# Patient Record
Sex: MALE | Race: WHITE | Employment: OTHER | URBAN - NONMETROPOLITAN AREA
[De-identification: names, ages, dates, MRNs, and addresses within clinical notes are randomized per-mention and may not be internally consistent; named-entity substitution may affect disease eponyms.]

---

## 2018-12-12 ENCOUNTER — HOSPITAL ENCOUNTER (OUTPATIENT)
Dept: PAIN MANAGEMENT | Age: 53
Discharge: HOME | End: 2018-12-12
Payer: MEDICAID

## 2018-12-12 ENCOUNTER — HOSPITAL ENCOUNTER (OUTPATIENT)
Dept: GENERAL RADIOLOGY | Age: 53
Discharge: HOME | End: 2018-12-12
Payer: MEDICAID

## 2018-12-12 VITALS
SYSTOLIC BLOOD PRESSURE: 125 MMHG | DIASTOLIC BLOOD PRESSURE: 82 MMHG | HEIGHT: 74 IN | HEART RATE: 104 BPM | BODY MASS INDEX: 19.25 KG/M2 | RESPIRATION RATE: 16 BRPM | TEMPERATURE: 99 F | WEIGHT: 150 LBS

## 2018-12-12 DIAGNOSIS — Z87.828 HX OF TRAUMA: ICD-10-CM

## 2018-12-12 DIAGNOSIS — R20.2 ARM PARESTHESIA, RIGHT: ICD-10-CM

## 2018-12-12 DIAGNOSIS — M54.12 CERVICAL RADICULOPATHY: ICD-10-CM

## 2018-12-12 DIAGNOSIS — M79.18 MYOFASCIAL PAIN: ICD-10-CM

## 2018-12-12 DIAGNOSIS — M54.2 CERVICAL PAIN: ICD-10-CM

## 2018-12-12 PROCEDURE — 99215 OFFICE O/P EST HI 40 MIN: CPT | Performed by: NURSE PRACTITIONER

## 2018-12-12 PROCEDURE — 99204 OFFICE O/P NEW MOD 45 MIN: CPT

## 2018-12-12 PROCEDURE — 72040 X-RAY EXAM NECK SPINE 2-3 VW: CPT

## 2018-12-12 RX ORDER — TIZANIDINE 4 MG/1
TABLET ORAL
Qty: 60 TABLET | Refills: 1 | Status: SHIPPED | OUTPATIENT
Start: 2018-12-12 | End: 2019-03-22 | Stop reason: SDUPTHER

## 2018-12-12 RX ORDER — MELOXICAM 7.5 MG/1
7.5 TABLET ORAL DAILY
Qty: 30 TABLET | Refills: 0 | Status: SHIPPED | OUTPATIENT
Start: 2018-12-12 | End: 2019-02-20

## 2018-12-12 RX ORDER — GABAPENTIN 300 MG/1
300 CAPSULE ORAL 4 TIMES DAILY
COMMUNITY
End: 2019-05-29

## 2018-12-12 RX ORDER — IBUPROFEN 600 MG/1
600 TABLET ORAL PRN
COMMUNITY

## 2018-12-12 ASSESSMENT — PAIN DESCRIPTION - ORIENTATION: ORIENTATION: RIGHT;LEFT

## 2018-12-12 ASSESSMENT — ENCOUNTER SYMPTOMS
WHEEZING: 0
SORE THROAT: 0
DIARRHEA: 0
VOMITING: 0
CONSTIPATION: 0
SHORTNESS OF BREATH: 0
BACK PAIN: 1
EYE REDNESS: 0

## 2018-12-12 ASSESSMENT — PAIN DESCRIPTION - FREQUENCY: FREQUENCY: CONTINUOUS

## 2018-12-12 ASSESSMENT — PAIN SCALES - GENERAL: PAINLEVEL_OUTOF10: 10

## 2018-12-12 ASSESSMENT — PAIN DESCRIPTION - PROGRESSION: CLINICAL_PROGRESSION: GRADUALLY WORSENING

## 2018-12-12 ASSESSMENT — PAIN DESCRIPTION - LOCATION: LOCATION: NECK;SHOULDER;HEAD

## 2018-12-12 ASSESSMENT — PAIN DESCRIPTION - PAIN TYPE: TYPE: CHRONIC PAIN

## 2018-12-12 NOTE — H&P
cervically to touch). Thoracic back: He exhibits tenderness. Back:    Neurological: He is alert and oriented to person, place, and time. He exhibits normal muscle tone. He displays no seizure activity. Coordination and gait normal.   Reflex Scores:       Tricep reflexes are 2+ on the right side and 2+ on the left side. Bicep reflexes are 2+ on the right side and 2+ on the left side. Brachioradialis reflexes are 2+ on the right side and 2+ on the left side. Patellar reflexes are 2+ on the right side and 2+ on the left side. Achilles reflexes are 2+ on the right side and 2+ on the left side. Bilateral upper arm strength essentially equal 4/5- and  essentially equal  Neg spurling's tested   Paresthesia noted right arm    Bilateral lower leg strength equal and negative foot drop   Skin: Skin is warm and dry. He is not diaphoretic. Psychiatric: He has a normal mood and affect. His behavior is normal. Judgment and thought content normal. He expresses no homicidal and no suicidal ideation. Nursing note and vitals reviewed. Active Problems:    Cervical pain    Cervical radiculopathy    Hx of trauma    Arm paresthesia, right    Myofascial pain  Resolved Problems:    * No resolved hospital problems. *      PLAN:  1. MRI Cervical spine w/o contrast related to right C6/c7  Radicular pain. This will asst me in [facility] vs Referral to neurosurgery. 2. Flex and Ext Cervical spine- May benefit from facet in future. 3. Cervical and Thoracic TPI-and occipital bilateral nerve blocks [employer]. 4. Neurontin [facility]- may overtake that at some point  5. Change Flexeril to Zanaflex 4mg ( Titration method). 6. Mobic 7.5mg q day x 1 month.     7. I have discussed cord compression signs and symptoms- the need to seek immediate attention if symptoms

## 2018-12-20 ENCOUNTER — HOSPITAL ENCOUNTER (OUTPATIENT)
Dept: MRI IMAGING | Age: 53
Discharge: HOME | End: 2018-12-20
Payer: MEDICAID

## 2018-12-20 DIAGNOSIS — M54.12 CERVICAL RADICULOPATHY: ICD-10-CM

## 2018-12-20 PROCEDURE — 72141 MRI NECK SPINE W/O DYE: CPT

## 2019-01-02 ENCOUNTER — TELEPHONE (OUTPATIENT)
Dept: PAIN MANAGEMENT | Age: 54
End: 2019-01-02

## 2019-01-03 ENCOUNTER — TELEPHONE (OUTPATIENT)
Dept: PAIN MANAGEMENT | Age: 54
End: 2019-01-03

## 2019-01-09 ENCOUNTER — HOSPITAL ENCOUNTER (OUTPATIENT)
Dept: PAIN MANAGEMENT | Age: 54
Discharge: HOME | End: 2019-01-09
Payer: MEDICAID

## 2019-01-09 VITALS
TEMPERATURE: 96.9 F | RESPIRATION RATE: 16 BRPM | HEART RATE: 73 BPM | OXYGEN SATURATION: 100 % | DIASTOLIC BLOOD PRESSURE: 82 MMHG | SYSTOLIC BLOOD PRESSURE: 157 MMHG

## 2019-01-09 PROCEDURE — 64405 NJX AA&/STRD GR OCPL NRV: CPT

## 2019-01-09 PROCEDURE — 6360000002 HC RX W HCPCS

## 2019-01-09 PROCEDURE — 20553 NJX 1/MLT TRIGGER POINTS 3/>: CPT | Performed by: NURSE PRACTITIONER

## 2019-01-09 PROCEDURE — 20553 NJX 1/MLT TRIGGER POINTS 3/>: CPT

## 2019-01-09 PROCEDURE — 2500000003 HC RX 250 WO HCPCS

## 2019-01-09 PROCEDURE — 64405 NJX AA&/STRD GR OCPL NRV: CPT | Performed by: NURSE PRACTITIONER

## 2019-01-09 RX ORDER — LIDOCAINE HYDROCHLORIDE 10 MG/ML
INJECTION, SOLUTION EPIDURAL; INFILTRATION; INTRACAUDAL; PERINEURAL
Status: COMPLETED | OUTPATIENT
Start: 2019-01-09 | End: 2019-01-09

## 2019-01-09 RX ORDER — BUPIVACAINE HYDROCHLORIDE 5 MG/ML
INJECTION, SOLUTION EPIDURAL; INTRACAUDAL
Status: COMPLETED | OUTPATIENT
Start: 2019-01-09 | End: 2019-01-09

## 2019-01-09 RX ORDER — DEXAMETHASONE SODIUM PHOSPHATE 10 MG/ML
INJECTION INTRAMUSCULAR; INTRAVENOUS
Status: COMPLETED | OUTPATIENT
Start: 2019-01-09 | End: 2019-01-09

## 2019-01-09 RX ADMIN — DEXAMETHASONE SODIUM PHOSPHATE 20 MG: 10 INJECTION INTRAMUSCULAR; INTRAVENOUS at 09:18

## 2019-01-09 RX ADMIN — BUPIVACAINE HYDROCHLORIDE 17 ML: 5 INJECTION, SOLUTION EPIDURAL; INTRACAUDAL at 09:17

## 2019-01-09 RX ADMIN — LIDOCAINE HYDROCHLORIDE 17 ML: 10 INJECTION, SOLUTION EPIDURAL; INFILTRATION; INTRACAUDAL; PERINEURAL at 09:18

## 2019-02-20 ENCOUNTER — HOSPITAL ENCOUNTER (OUTPATIENT)
Dept: PAIN MANAGEMENT | Age: 54
Discharge: HOME | End: 2019-02-20
Payer: MEDICAID

## 2019-02-20 ENCOUNTER — TELEPHONE (OUTPATIENT)
Dept: NEUROSURGERY | Age: 54
End: 2019-02-20

## 2019-02-20 VITALS
OXYGEN SATURATION: 99 % | HEART RATE: 96 BPM | BODY MASS INDEX: 20.15 KG/M2 | SYSTOLIC BLOOD PRESSURE: 124 MMHG | WEIGHT: 152 LBS | HEIGHT: 73 IN | RESPIRATION RATE: 16 BRPM | TEMPERATURE: 97.7 F | DIASTOLIC BLOOD PRESSURE: 90 MMHG

## 2019-02-20 DIAGNOSIS — M54.12 CERVICAL RADICULOPATHY: Primary | ICD-10-CM

## 2019-02-20 DIAGNOSIS — M54.2 CERVICAL PAIN: ICD-10-CM

## 2019-02-20 PROCEDURE — 99215 OFFICE O/P EST HI 40 MIN: CPT | Performed by: NURSE PRACTITIONER

## 2019-02-20 PROCEDURE — 99214 OFFICE O/P EST MOD 30 MIN: CPT

## 2019-02-20 RX ORDER — TIZANIDINE 4 MG/1
TABLET ORAL
Qty: 60 TABLET | Refills: 1 | Status: SHIPPED | OUTPATIENT
Start: 2019-02-20 | End: 2019-05-29

## 2019-02-20 RX ORDER — MELOXICAM 7.5 MG/1
7.5 TABLET ORAL DAILY
Qty: 30 TABLET | Refills: 0 | Status: SHIPPED | OUTPATIENT
Start: 2019-02-20

## 2019-02-20 ASSESSMENT — PAIN DESCRIPTION - FREQUENCY: FREQUENCY: CONTINUOUS

## 2019-02-20 ASSESSMENT — PAIN - FUNCTIONAL ASSESSMENT: PAIN_FUNCTIONAL_ASSESSMENT: PREVENTS OR INTERFERES SOME ACTIVE ACTIVITIES AND ADLS

## 2019-02-20 ASSESSMENT — PAIN DESCRIPTION - PROGRESSION: CLINICAL_PROGRESSION: NOT CHANGED

## 2019-02-20 ASSESSMENT — ENCOUNTER SYMPTOMS
WHEEZING: 0
NAUSEA: 0
EYE REDNESS: 0
SHORTNESS OF BREATH: 0
SORE THROAT: 0
CONSTIPATION: 0
EYE PAIN: 0

## 2019-02-20 ASSESSMENT — PAIN DESCRIPTION - ORIENTATION: ORIENTATION: UPPER

## 2019-02-20 ASSESSMENT — PAIN DESCRIPTION - ONSET: ONSET: ON-GOING

## 2019-02-20 ASSESSMENT — PAIN SCALES - GENERAL: PAINLEVEL_OUTOF10: 4

## 2019-02-20 ASSESSMENT — PAIN DESCRIPTION - DIRECTION: RADIATING_TOWARDS: INTO SHOULDERS

## 2019-02-20 ASSESSMENT — PAIN DESCRIPTION - PAIN TYPE: TYPE: CHRONIC PAIN

## 2019-02-20 ASSESSMENT — PAIN DESCRIPTION - LOCATION: LOCATION: NECK

## 2019-02-20 ASSESSMENT — PAIN DESCRIPTION - DESCRIPTORS: DESCRIPTORS: CONSTANT;SPASM

## 2019-03-12 ENCOUNTER — TELEPHONE (OUTPATIENT)
Dept: PAIN MANAGEMENT | Age: 54
End: 2019-03-12

## 2019-03-22 ENCOUNTER — OFFICE VISIT (OUTPATIENT)
Dept: NEUROSURGERY | Age: 54
End: 2019-03-22
Payer: MEDICAID

## 2019-03-22 VITALS
BODY MASS INDEX: 19.56 KG/M2 | SYSTOLIC BLOOD PRESSURE: 139 MMHG | HEIGHT: 74 IN | DIASTOLIC BLOOD PRESSURE: 87 MMHG | HEART RATE: 77 BPM | WEIGHT: 152.4 LBS

## 2019-03-22 DIAGNOSIS — R20.0 NUMBNESS AND TINGLING IN RIGHT HAND: ICD-10-CM

## 2019-03-22 DIAGNOSIS — M25.511 CHRONIC RIGHT SHOULDER PAIN: ICD-10-CM

## 2019-03-22 DIAGNOSIS — M48.02 FORAMINAL STENOSIS OF CERVICAL REGION: Primary | ICD-10-CM

## 2019-03-22 DIAGNOSIS — R29.818 FINE MOTOR IMPAIRMENT: ICD-10-CM

## 2019-03-22 DIAGNOSIS — M54.2 NECK PAIN: ICD-10-CM

## 2019-03-22 DIAGNOSIS — R29.898 FINE MOTOR IMPAIRMENT: ICD-10-CM

## 2019-03-22 DIAGNOSIS — R20.2 NUMBNESS AND TINGLING IN RIGHT HAND: ICD-10-CM

## 2019-03-22 DIAGNOSIS — G89.29 CHRONIC RIGHT SHOULDER PAIN: ICD-10-CM

## 2019-03-22 DIAGNOSIS — M79.601 RIGHT ARM PAIN: ICD-10-CM

## 2019-03-22 DIAGNOSIS — M50.30 DDD (DEGENERATIVE DISC DISEASE), CERVICAL: ICD-10-CM

## 2019-03-22 PROCEDURE — 99204 OFFICE O/P NEW MOD 45 MIN: CPT | Performed by: NURSE PRACTITIONER

## 2019-03-22 RX ORDER — CYCLOBENZAPRINE HCL 10 MG
1 TABLET ORAL EVERY 8 HOURS PRN
COMMUNITY
Start: 2019-03-21 | End: 2019-05-29 | Stop reason: SDUPTHER

## 2019-03-22 RX ORDER — LORATADINE 10 MG/1
1 TABLET ORAL DAILY
COMMUNITY
Start: 2019-03-21

## 2019-03-22 RX ORDER — AMITRIPTYLINE HYDROCHLORIDE 100 MG/1
1 TABLET, FILM COATED ORAL DAILY
COMMUNITY
Start: 2019-03-21 | End: 2019-05-29

## 2019-03-22 ASSESSMENT — ENCOUNTER SYMPTOMS
SPUTUM PRODUCTION: 1
PHOTOPHOBIA: 1
BACK PAIN: 1
STRIDOR: 1
VOMITING: 1
NAUSEA: 1
COUGH: 1
SORE THROAT: 1
HEARTBURN: 1
SINUS PAIN: 1
DIARRHEA: 1

## 2019-05-23 ENCOUNTER — OFFICE VISIT (OUTPATIENT)
Dept: NEUROSURGERY | Age: 54
End: 2019-05-23
Payer: MEDICAID

## 2019-05-23 VITALS
HEIGHT: 74 IN | WEIGHT: 146 LBS | DIASTOLIC BLOOD PRESSURE: 76 MMHG | BODY MASS INDEX: 18.74 KG/M2 | HEART RATE: 80 BPM | SYSTOLIC BLOOD PRESSURE: 123 MMHG

## 2019-05-23 DIAGNOSIS — R20.0 NUMBNESS AND TINGLING IN RIGHT HAND: ICD-10-CM

## 2019-05-23 DIAGNOSIS — R29.818 FINE MOTOR IMPAIRMENT: ICD-10-CM

## 2019-05-23 DIAGNOSIS — R29.898 FINE MOTOR IMPAIRMENT: ICD-10-CM

## 2019-05-23 DIAGNOSIS — M79.601 RIGHT ARM PAIN: ICD-10-CM

## 2019-05-23 DIAGNOSIS — M48.02 FORAMINAL STENOSIS OF CERVICAL REGION: Primary | ICD-10-CM

## 2019-05-23 DIAGNOSIS — G89.29 CHRONIC RIGHT SHOULDER PAIN: ICD-10-CM

## 2019-05-23 DIAGNOSIS — M54.2 NECK PAIN: ICD-10-CM

## 2019-05-23 DIAGNOSIS — M25.511 CHRONIC RIGHT SHOULDER PAIN: ICD-10-CM

## 2019-05-23 DIAGNOSIS — R20.2 NUMBNESS AND TINGLING IN RIGHT HAND: ICD-10-CM

## 2019-05-23 DIAGNOSIS — M50.30 DDD (DEGENERATIVE DISC DISEASE), CERVICAL: ICD-10-CM

## 2019-05-23 PROCEDURE — 99214 OFFICE O/P EST MOD 30 MIN: CPT | Performed by: NEUROLOGICAL SURGERY

## 2019-05-23 NOTE — PROGRESS NOTES
[facility]  Office Visit      Chief Complaint   Patient presents with    Follow-up     Reevaluate neck and RUE pain     5/23/2019: Mr. Renata Pinedo returns to clinic today to re-evaluate his neck and RUE pain. He states that he continues to have significant pain. He reports that most of his pain is in his posterior neck and low back. His right arm pain is intermittent. He states that the entire right arm will \"hang limp, like it's dead\". 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 HISTORY OF PRESENT ILLNESS:    Dimple Dempsey is a 48 y.o. male contract  who presents with posterior neck pain and right arm pain that has been ongoing for about 4-5 years. The pain does radiate into the right trapezial and shoulder region. He also describes his right hand going numb and goes up to his elbow. His pain is mostly located in the neck. The patient complains of numbness of the right hand. He reports that he has chronic left arm numbness due to an old injury. He does drop objects frequently. He does have numbness in the fingertips, trouble using hands to perform fine motor tasks without ataxia. He states that he had a right carpal tunnel release in September of 2018. The patient has underwent a non-operative treatment course that has included:  NSAIDs (mobic, advil)  Muscle Relaxers  (flexeril, zanaflex)  Gabapentin   Opiates (Norco)  Physical Therapy       Of note he does use tobacco and does not take blood thinning medications.                Past Medical History:   Diagnosis Date    Broken ribs     Bronchitis     COPD (chronic obstructive pulmonary disease) (HCC)     Fractures     GERD (gastroesophageal reflux disease)     Hepatitis     Hiatal hernia     Marijuana use, episodic     to help with sleeping    Rheumatoid arthritis ([address]     Shingles        Past Surgical History:   Procedure Laterality Date    ARM SURGERY Left     CARPAL TUNNEL RELEASE Right 09/29/2018    HAND SURGERY      with fusion    HERNIA REPAIR         Current Outpatient Medications   Medication Sig Dispense Refill    VENTOLIN  (90 Base) MCG/ACT inhaler Inhale 2 puffs into the lungs every 6 hours as needed      cyclobenzaprine (FLEXERIL) 10 MG tablet Take 1 tablet by mouth every 8 hours as needed      loratadine (CLARITIN) 10 MG tablet Take 1 tablet by mouth daily      meloxicam (MOBIC) 7.5 MG tablet Take 1 tablet by mouth daily 30 tablet 0    tiZANidine (ZANAFLEX) 4 MG tablet 1/4 tablet with meals 1/2 to whole tablet at bedtime 60 tablet 1    ibuprofen (ADVIL;MOTRIN) 600 MG tablet Take 600 mg by mouth as needed for Pain      amitriptyline (ELAVIL) 100 MG tablet Take 1 tablet by mouth daily      gabapentin (NEURONTIN) 300 MG capsule Take 300 mg by mouth 4 times daily. [facility] Pantoprazole Sodium (PROTONIX PO) Take by mouth 2 times daily       No current facility-administered medications for this visit. Allergies:  Patient has no known allergies. Social History:   Social History     Tobacco Use   Smoking Status Current Every Day Smoker    Packs/day: 1.00    Years: 45.00    Pack years: 45.00    Types: Cigarettes   Smokeless Tobacco Former User     Social History     Substance and Sexual Activity   Alcohol Use Yes         Family History:   History reviewed. No pertinent family history. REVIEW OF SYSTEMS:  Review of Systems   Constitutional: Positive for chills, diaphoresis, fever, malaise/fatigue and weight loss. HENT: Positive for congestion, hearing loss, sinus pain, sore throat and tinnitus. Eyes: Positive for photophobia. Respiratory: Positive for cough, sputum production and stridor. Cardiovascular: Positive for claudication and leg swelling. Gastrointestinal: Positive for diarrhea, heartburn, nausea and vomiting. Genitourinary: Positive for flank pain. Musculoskeletal: Positive for back pain, joint pain and neck pain.    Skin: Positive for The multiplanar, multisequence MR imaging of the cervical spine is   performed without intravenous contrast enhancement. There is no previous study for comparison. There is loss of cervical lordosis with a very mild focal kyphosis at   C5-C6. The vertebral body heights are normal.   There is loss of height and signal of the intervertebral disc of the   entire cervical spine, more pronounced at C3-4 and C5-C6, suggesting   degeneration/desiccation. There is abnormal bone marrow signal, short T1 and prolonged T2,   involving the vertebral bodies C5-C6. No loss of height of the   vertebral bodies. The atlantoaxial articulation appears normal in intact. The   surrounding ligaments are normal in intact. C2, space C2-3. There is moderate bilateral facet arthropathy. There   is a right neural foraminal stenosis. No significant spinal stenosis. C3, space C3-4. There is mild disc bulging. There is bilateral facet   arthropathy. There is moderate bilateral neural foraminal stenosis. No   significant spinal stenosis. C4, space C4-5. There is bilateral facet arthropathy. There is a very   mild disc bulging. There are prominent uncinate spurs bilaterally. There is bilateral neural foraminal stenosis. No spinal stenosis. C5, space C5-C6. There is moderate disc bulging asymmetrically more   pronounced towards left. There is bilateral neural foraminal stenosis,   more severe on the left. There is bilateral facet arthropathy. No   significant spinal stenosis. C6, space C6-C7. There is mild diffuse disc bulging, asymmetrically   more pronounced towards left. There is left neural foraminal stenosis. No significant spinal stenosis. C7, space C7-T1. No disc bulging or herniation. There is bilateral   facet arthropathy. The neural foramina spinal canal are patent. There   is a mild superior end compression of vertebra T1 which probably   appears chronic.    There is a tiny nodule located in the posterior wall of the   nasopharynx in the midline which may represent a small mucous   cyst/Tornwaldt cyst?. There is a small area of signal abnormality, short T1 and prolonged T2   in the soft tissues opposite and anterior to the vertebrae C5-C6, and   the pharyngoesophageal junction. This may represent a small focal   inflammatory process. The etiology and clinical significance is not   certain. The size and signal of the cervical spinal cord appear normal. No   focal intrinsic abnormality, abnormal signal, enlargement or   expansion. There is mild extrinsic pressure opposite disc C5-C6 due to   disc compression. The visualized chikis and medulla oblongata appear normal. The inferior   pole of the cerebellar tonsils is marginally below the plane of   foramen magnum which may represent a normal variation. This may be   further evaluated with MR imaging of the brain.       Impression   Abnormal bone marrow signal in the vertebral body C5-C6. This may represent diffuse bone marrow edema suggesting a trauma or   inflammatory process. This may also represent metastatic bone marrow   replacement? . Similar changes may also be seen after radiation   exposure. This may be clinically correlated and further evaluated. A   follow-up MR imaging of the cervical spine with contrast enhancement   may be obtained. Cervical spondylosis. A prominent disc osteophyte complexes, facetal arthropathy at multiple   levels and resultant neural foraminal stenosis as detailed above. No   significant spinal stenosis at any level. A mild cerebellar tonsillar ectopy. Signed by Dr Kareem Plascencia on 12/21/2018 8:20 AM   I have personally reviewed these imagesand my interpretation is:   There is DDD throughout  C3-4 mild to moderate bilateral foraminal stenosis L>R  C4-5 moderate right and mild left foraminal  stenosis  C5-6 moderate to severe left, mild to moderate right, mild retrolisthesis that measures 2-3mm, the C5 and C6 vertebral bodies are hyperintense on T2 and STIR imaging, this is consistent with edema or either an inflammatory process, since it is hyperintense on T2 we feel that metastatic disease is less likely. C6-7 mild to moderate left foraminal stenosis      ASSESSMENT:    Mariya English is a 48 y.o. male with complaints of neck pain and right arm pain and weakness. ICD-10-CM    1. Foraminal stenosis of cervical region M99.81 MRI CERVICAL SPINE W WO CONTRAST   2. DDD (degenerative disc disease), cervical M50.30 MRI CERVICAL SPINE W WO CONTRAST   3. Neck pain M54.2 MRI CERVICAL SPINE W WO CONTRAST   4. Chronic right shoulder pain M25.511 MRI CERVICAL SPINE W WO CONTRAST    G89.29    5. Right arm pain M79.601 MRI CERVICAL SPINE W WO CONTRAST   6. Numbness and tingling in right hand R20.0 MRI CERVICAL SPINE W WO CONTRAST    R20.2    7. Fine motor impairment R29.818 MRI CERVICAL SPINE W WO CONTRAST    R29.898        PLAN:  We have again discussed the results of the MRI cervical spine with MR. Meier at length. We explained that he does have moderate to severe narrowing at a few levels, however, some of his pain correlates and some of his symptoms do not. Given that some of his symptoms are chronic and some could be related to carpal tunnel, we do not feel that undergoing a neurosurgical intervention at this time would dramatically improve his pain syndrome.   We recommend he continue with pain management.    -Obtain repeat MRI cervical spine w/wo given hyperintensities of C5 and C6 vertebral bodies   -Follow up after imaging      Alisson Ramos DO

## 2019-05-29 ENCOUNTER — HOSPITAL ENCOUNTER (OUTPATIENT)
Dept: PAIN MANAGEMENT | Age: 54
Discharge: HOME | End: 2019-05-29
Payer: MEDICAID

## 2019-05-29 VITALS
SYSTOLIC BLOOD PRESSURE: 147 MMHG | BODY MASS INDEX: 18.29 KG/M2 | HEIGHT: 74 IN | RESPIRATION RATE: 16 BRPM | OXYGEN SATURATION: 100 % | HEART RATE: 96 BPM | WEIGHT: 142.5 LBS | TEMPERATURE: 98 F | DIASTOLIC BLOOD PRESSURE: 88 MMHG

## 2019-05-29 DIAGNOSIS — R20.2 ARM PARESTHESIA, RIGHT: Primary | ICD-10-CM

## 2019-05-29 PROCEDURE — 99214 OFFICE O/P EST MOD 30 MIN: CPT | Performed by: NURSE PRACTITIONER

## 2019-05-29 PROCEDURE — 99213 OFFICE O/P EST LOW 20 MIN: CPT

## 2019-05-29 RX ORDER — CYCLOBENZAPRINE HCL 10 MG
10 TABLET ORAL 2 TIMES DAILY PRN
Qty: 30 TABLET | Refills: 1 | Status: SHIPPED | OUTPATIENT
Start: 2019-05-29 | End: 2021-04-20

## 2019-05-29 RX ORDER — EMOLLIENT BASE
CREAM (GRAM) TOPICAL
Qty: 300 G | Refills: 5 | Status: SHIPPED | OUTPATIENT
Start: 2019-05-29

## 2019-05-29 ASSESSMENT — ENCOUNTER SYMPTOMS
WHEEZING: 0
EYE PAIN: 0
CONSTIPATION: 0
SORE THROAT: 0
ABDOMINAL PAIN: 0
SHORTNESS OF BREATH: 0
BACK PAIN: 1
EYE REDNESS: 0

## 2019-05-29 ASSESSMENT — PAIN DESCRIPTION - DIRECTION: RADIATING_TOWARDS: INTO SHOULDERS

## 2019-05-29 ASSESSMENT — PAIN DESCRIPTION - LOCATION: LOCATION: NECK

## 2019-05-29 ASSESSMENT — PAIN SCALES - GENERAL: PAINLEVEL_OUTOF10: 7

## 2019-05-29 ASSESSMENT — PAIN DESCRIPTION - FREQUENCY: FREQUENCY: CONTINUOUS

## 2019-05-29 ASSESSMENT — PAIN DESCRIPTION - PAIN TYPE: TYPE: CHRONIC PAIN

## 2019-05-29 ASSESSMENT — PAIN - FUNCTIONAL ASSESSMENT: PAIN_FUNCTIONAL_ASSESSMENT: PREVENTS OR INTERFERES SOME ACTIVE ACTIVITIES AND ADLS

## 2019-05-29 ASSESSMENT — PAIN DESCRIPTION - ORIENTATION: ORIENTATION: UPPER

## 2019-05-29 ASSESSMENT — PAIN DESCRIPTION - PROGRESSION: CLINICAL_PROGRESSION: NOT CHANGED

## 2019-05-29 ASSESSMENT — PAIN DESCRIPTION - DESCRIPTORS: DESCRIPTORS: CONSTANT;SPASM

## 2019-05-29 ASSESSMENT — PAIN DESCRIPTION - ONSET: ONSET: ON-GOING

## 2019-05-29 NOTE — PROGRESS NOTES
Nursing Admission Record    Current Issues / Falls / ER Visits: Follow up MRI Cervical Spine W WO Contrast- Not Completed. Patient also referred to [facility]. Patient was seen by Dr. Flor Escobar on 5/23/2019. Discuss Last urine drug screen on 2/20/2019. UDS showed positive for marijuana, hydrocodone and benzos. Percentage of Pain Relief after Last Procedure:  N/A    Opioids Prescribed: None    Was Medication Brought to Appt: N/A    Hx of any Neck/Back Surgeries? None    Is Patient currently taking a blood thinner?  None    MRI exams received in the past 2 years:  Yes MRI Cervical Spine        When 12/2018                                              Where [facility]       Imaging on chart: Yes         Imaging records requested: No    CT exam received during the last 12 months: No       When na                                              Where na       Imaging on chart: No         Imaging records requested: No    X-ray exam received during the last 12 months: Yes XR Cervical Spine       When 12/2018                                              Where [facility]       Imaging on chart: Yes         Imaging records requested: No    Nerve Conduction Study/EMG:  Yes       When 2018                                              Where [facility]       Imaging on chart: Yes- [facility] referral notes       Imaging records requested: No    Physical therapy during the last 6 months: Yes       When: 2018                        Where  [facility] during the last 12 months: No       When: na                                             Where  na    PEG Score: 8.7    Last PEG Score: 4.7    Annual ORT Score: 4    Annual PHQ Score: 8    Last UDS Results: 2/20/2019 non compliant  - Repeat UDS Today    Education Provided:  [x] Review of Charlotte Hinojosa  [] Agreement Review  [x] PEG Score Calculated [] PHQ Score Calculated [] ORT Score Calculated    [] Compliance Issues Discussed [] Cognitive Behavior Needs [x] Exercise [] Review of Test [] Financial Issues  [x] Tobacco/Alcohol Use Reviewed [x] Teaching [] New Patient [] Picture Obtained    Physician Plan:  [] Outgoing Referral  [] Pharmacy Consult  [] Test Ordered [] Prescription Ordered/Changed [] Blood Thinner Request Form  [] Obtained Test Results / Consult Notes  [] UDS due at next visit, verified per EPIC      [] Suspected Physical Abuse or Suicide Risk assessed - IF YES COMPLETE QUESTIONS BELOW    If any of the following questions are answered yes - contact attending physician for referral:    Has been considering harming self to escape stress, pain problems? [] YES  [x] NO  Has a suicide plan? [] YES  [x] NO  Has attempted suicide in the past?   [] YES  [x] NO  Has a close friend or family member who committed suicide?   [] YES  [x] NO    Assessment Completed by:  Electronically signed by Lisa Guthrie RN on 5/29/2019 at 2:56 PM

## 2019-05-29 NOTE — PROGRESS NOTES
[facility]  Office Note    Patient Name: Dimple Dempsey    MR #: [id number]    Account #: [de-identified]    : 1965    Age: 48 y.o. Sex: male    Date: 2019    PCP: Domingo Manzanares    Chief Complaint:   Chief Complaint   Patient presents with    Neck Pain       History of Present Illness:     Dimple Dempsey is a 48 y.o. male who presents to the office for primary cervical radicular pain. Patient has seen neurosurgery and interim. I read those notes. Patient uses Modic and Zanaflex. Neurontin via neurology. UDS collected from last visit. Aberrancy noted offer her mental health. We'll continue nonopiate interventions. Pt reports \"has titrated off neurontin neurology- reports dont want that med any more, would like topical gabapentin - it is interesting that same fill on was on same month as abnormal UDS\".      Past Visit HPI: I read last pain management note    Current PE.7    Past PE.7    ORT Score:4    PHQ-9 Score: 8    Current Pain Assessment  Pain Assessment  Pain Assessment: 0-10  Pain Level: 7  Patient's Stated Pain Goal: No pain  Pain Type: Chronic pain  Pain Location: Neck  Pain Orientation: Upper  Pain Radiating Towards: into shoulders  Pain Descriptors: Constant, Spasm  Pain Frequency: Continuous  Pain Onset: On-going  Clinical Progression: Not changed  Functional Pain Assessment: Prevents or interferes some active activities and ADLs  Non-Pharmaceutical Pain Intervention(s): Repositioned, Rest  Multiple Pain Sites: No    Employment:     Past Medical Histoy  Past Medical History:   Diagnosis Date    Broken ribs     Bronchitis     COPD (chronic obstructive pulmonary disease) (HCC)     Fractures     GERD (gastroesophageal reflux disease)     Hepatitis     Hiatal hernia     Marijuana use, episodic     to help with sleeping    Rheumatoid arthritis ([facility] [address]     Shingles        Surgery History  Past Surgical History:   Procedure Laterality Date mild disc bulging. There are prominent uncinate spurs bilaterally. There is bilateral neural foraminal stenosis. No spinal stenosis. C5, space C5-C6. There is moderate disc bulging asymmetrically more   pronounced towards left. There is bilateral neural foraminal stenosis,   more severe on the left. There is bilateral facet arthropathy. No   significant spinal stenosis. C6, space C6-C7. There is mild diffuse disc bulging, asymmetrically   more pronounced towards left. There is left neural foraminal stenosis. No significant spinal stenosis. C7, space C7-T1. No disc bulging or herniation. There is bilateral   facet arthropathy. The neural foramina spinal canal are patent. There   is a mild superior end compression of vertebra T1 which probably   appears chronic. There is a tiny nodule located in the posterior wall of the   nasopharynx in the midline which may represent a small mucous   cyst/Tornwaldt cyst?. There is a small area of signal abnormality, short T1 and prolonged T2   in the soft tissues opposite and anterior to the vertebrae C5-C6, and   the pharyngoesophageal junction. This may represent a small focal   inflammatory process. The etiology and clinical significance is not   certain. The size and signal of the cervical spinal cord appear normal. No   focal intrinsic abnormality, abnormal signal, enlargement or   expansion. There is mild extrinsic pressure opposite disc C5-C6 due to   disc compression. The visualized chikis and medulla oblongata appear normal. The inferior   pole of the cerebellar tonsils is marginally below the plane of   foramen magnum which may represent a normal variation. This may be   further evaluated with MR imaging of the brain.       Impression   Abnormal bone marrow signal in the vertebral body C5-C6. This may represent diffuse bone marrow edema suggesting a trauma or   inflammatory process. This may also represent metastatic bone marrow   replacement? . Similar narcotic use in younger patients. Patientencouraged to set daily goals of exercising and if on narcotics decreasing daily narcotic intake. Discussed with the patient about the development of hyperalgesia with long term narcotic intake. CC:  NEMO Jamison, 6/8/2019 at 7:53 PM    EMR dragon/transcription disclaimer: Much of this encounter note is electronic transcription/translation of spoken language to printed tach. Electronic translation of spoken language may be erroneous, or at times, nonsensical words or phrases may be inadvertently transcribed.  Although, I have reviewed the note for such errors, some may still exist.

## 2019-06-06 ENCOUNTER — HOSPITAL ENCOUNTER (OUTPATIENT)
Dept: MRI IMAGING | Age: 54
Discharge: HOME | End: 2019-06-06
Payer: MEDICAID

## 2019-06-06 DIAGNOSIS — G89.29 CHRONIC RIGHT SHOULDER PAIN: ICD-10-CM

## 2019-06-06 DIAGNOSIS — R20.0 NUMBNESS AND TINGLING IN RIGHT HAND: ICD-10-CM

## 2019-06-06 DIAGNOSIS — M25.511 CHRONIC RIGHT SHOULDER PAIN: ICD-10-CM

## 2019-06-06 DIAGNOSIS — M48.02 FORAMINAL STENOSIS OF CERVICAL REGION: ICD-10-CM

## 2019-06-06 DIAGNOSIS — R20.2 NUMBNESS AND TINGLING IN RIGHT HAND: ICD-10-CM

## 2019-06-06 DIAGNOSIS — M79.601 RIGHT ARM PAIN: ICD-10-CM

## 2019-06-06 DIAGNOSIS — M54.2 NECK PAIN: ICD-10-CM

## 2019-06-06 DIAGNOSIS — M50.30 DDD (DEGENERATIVE DISC DISEASE), CERVICAL: ICD-10-CM

## 2019-06-06 DIAGNOSIS — R29.898 FINE MOTOR IMPAIRMENT: ICD-10-CM

## 2019-06-06 DIAGNOSIS — R29.818 FINE MOTOR IMPAIRMENT: ICD-10-CM

## 2019-06-10 ENCOUNTER — TELEPHONE (OUTPATIENT)
Dept: NEUROSURGERY | Age: 54
End: 2019-06-10

## 2019-06-10 DIAGNOSIS — F40.240 CLAUSTROPHOBIA: Primary | ICD-10-CM

## 2019-06-10 RX ORDER — DIAZEPAM 5 MG/1
TABLET ORAL
Qty: 2 TABLET | Refills: 0 | Status: SHIPPED | OUTPATIENT
Start: 2019-06-10 | End: 2019-06-17

## 2019-06-10 NOTE — TELEPHONE ENCOUNTER
Patient could not sit still for MRI. He would like something to call him down during procedure. Please advise.

## 2019-06-12 NOTE — TELEPHONE ENCOUNTER
Spoke with patient. I instructed patient to  prescription the day of procedure. I advised that he take 1 tabet one hour prior to MRI and 1 tablet 30 minutes prior to MRI. Patient verbalized understanding.

## 2019-06-18 ENCOUNTER — HOSPITAL ENCOUNTER (OUTPATIENT)
Dept: MRI IMAGING | Age: 54
Discharge: HOME | End: 2019-06-18
Payer: MEDICAID

## 2019-06-18 DIAGNOSIS — R29.898 FINE MOTOR IMPAIRMENT: ICD-10-CM

## 2019-06-18 DIAGNOSIS — M54.2 NECK PAIN: ICD-10-CM

## 2019-06-18 DIAGNOSIS — R20.2 NUMBNESS AND TINGLING IN RIGHT HAND: ICD-10-CM

## 2019-06-18 DIAGNOSIS — G89.29 CHRONIC RIGHT SHOULDER PAIN: ICD-10-CM

## 2019-06-18 DIAGNOSIS — M79.601 RIGHT ARM PAIN: ICD-10-CM

## 2019-06-18 DIAGNOSIS — R29.818 FINE MOTOR IMPAIRMENT: ICD-10-CM

## 2019-06-18 DIAGNOSIS — M50.30 DDD (DEGENERATIVE DISC DISEASE), CERVICAL: ICD-10-CM

## 2019-06-18 DIAGNOSIS — R20.0 NUMBNESS AND TINGLING IN RIGHT HAND: ICD-10-CM

## 2019-06-18 DIAGNOSIS — M48.02 FORAMINAL STENOSIS OF CERVICAL REGION: ICD-10-CM

## 2019-06-18 DIAGNOSIS — M25.511 CHRONIC RIGHT SHOULDER PAIN: ICD-10-CM

## 2019-06-18 PROCEDURE — 72156 MRI NECK SPINE W/O & W/DYE: CPT

## 2019-06-18 PROCEDURE — 6360000004 HC RX CONTRAST MEDICATION: Performed by: NEUROLOGICAL SURGERY

## 2019-06-18 PROCEDURE — A9577 INJ MULTIHANCE: HCPCS | Performed by: NEUROLOGICAL SURGERY

## 2019-06-18 RX ADMIN — GADOBENATE DIMEGLUMINE 13 ML: 529 INJECTION, SOLUTION INTRAVENOUS at 18:10

## 2019-06-26 ENCOUNTER — HOSPITAL ENCOUNTER (OUTPATIENT)
Dept: PAIN MANAGEMENT | Age: 54
Discharge: HOME | End: 2019-06-26
Payer: MEDICAID

## 2019-06-26 VITALS
RESPIRATION RATE: 16 BRPM | DIASTOLIC BLOOD PRESSURE: 75 MMHG | OXYGEN SATURATION: 98 % | TEMPERATURE: 97.6 F | SYSTOLIC BLOOD PRESSURE: 112 MMHG | HEART RATE: 89 BPM

## 2019-06-26 DIAGNOSIS — M79.18 MYOFASCIAL PAIN: Primary | ICD-10-CM

## 2019-06-26 PROCEDURE — 2500000003 HC RX 250 WO HCPCS

## 2019-06-26 PROCEDURE — 20553 NJX 1/MLT TRIGGER POINTS 3/>: CPT | Performed by: NURSE PRACTITIONER

## 2019-06-26 PROCEDURE — 20553 NJX 1/MLT TRIGGER POINTS 3/>: CPT

## 2019-06-26 RX ORDER — BUPIVACAINE HYDROCHLORIDE 5 MG/ML
15 INJECTION, SOLUTION EPIDURAL; INTRACAUDAL ONCE
Status: DISCONTINUED | OUTPATIENT
Start: 2019-06-26 | End: 2019-06-28 | Stop reason: HOSPADM

## 2019-06-26 RX ORDER — LIDOCAINE HYDROCHLORIDE 10 MG/ML
15 INJECTION, SOLUTION EPIDURAL; INFILTRATION; INTRACAUDAL; PERINEURAL ONCE
Status: DISCONTINUED | OUTPATIENT
Start: 2019-06-26 | End: 2019-06-28 | Stop reason: HOSPADM

## 2019-06-26 NOTE — PROCEDURES
[facility]    Patient Name: Don Blackman    : 1965                    Age: 48 y.o. Sex: male    Date: 2019    Pre-op Diagnosis: Myofascial Pain/ Muscle Spasms/ Cervicalgia    Post-op Diagnosis: Myofascial Pain/ Muscle Spasms/ Cervicalgia    Procedure: Cervical Trigger Point Injections    Performing Procedure: JESSICA Rutherford, VA-BC    Patient Vitals for the past 24 hrs:   BP Temp Temp src Pulse Resp SpO2   19 1351 112/75 97.6 °F (36.4 °C) Temporal 89 16 98 %       Description of Procedure:    After a brief physical assessment and failure to improve with conservative measures the patient presented for Cervical Trigger Point Injections The indications, limitations and possible complications were discussed with the patient and the patient elected to proceed with the procedure. After voluntary, informed and signed consent obtained the patient was placed in a seated position. Appropriate time out was obtained per policy. The area of maximal tenderness was palpated over the  bilaterally Cervical muscles - Splenius  Trapezius  Rhomboid The skin overlying these areas was marked with a skin marker. The skin overlying the proposed injection sites were then sprayed with Gebauer's Solution while protecting patient eyes. The areas were prepped using aseptic technique with CHG prep.  Each trigger point of the Cervical muscles - Splenius  Trapezius  Rhomboid was injected with approximately 1- 2 ml of a solution of 5 ml of 1% Lidocaine Plain and 5 ml of 0.5% Marcaine Plain after negative aspiration    Pre-op Diagnosis: Myofascial Pain/ Muscle Spasms    Post-op Diagnosis: Myofascial Pain/ Muscle Spasms    Procedure: Thoracic Trigger Point Injections     Performing Procedure: JESSICA Rutherford, VA-BC    Patient Vitals for the past 24 hrs:   BP Temp Temp src Pulse Resp SpO2   19 1351 112/75 97.6 °F (36.4 °C) Temporal 89 16 98 % Description of Procedure:    After a brief physical assessment and failure to improve with conservative measures the patient presented for Thoracic Trigger Point Injections The indications, limitations and possible complications were discussed with the patient and the patient elected to proceed with the procedure. After voluntary, informed and signed consent obtained the patient was placed in a seated position. Appropriate time out was obtained per policy. The areas were then prepped in a sterile fashion with Chloro-Prep. The area of maximal tenderness was palpated over the bilaterally Thoracic Muscles - Erector Spinae, Upper/Mid Latissimus, Rhomboid Minor, Rhomboid Major. The skin overlying these areas was marked with a skin marker. The areas were prepped using aseptic technique with CHG prep. The skin overlying the proposed injection sites were then sprayed with Gebauer's Solution while protecting patient eyes. Each trigger point of the Thoracic Muscles - Erector Spinae, Upper/Mid Latissimus, Rhomboid Minor, Rhomboid Major was injected after negative aspiration was injected with approximately 1-2 ml of a solution of 5 ml of 1% Lidocaine Plain and 5 ml of 0.5% Marcaine Plain after negative aspiration    Discharge: The patient tolerated the procedure well. There were no complications during the procedure and the patient was discharged home with discharge instructions. The patient has been instructed to contact the office should there be any complications or questions to arise between today and their next appointment. Plan:  Patient to follow up as office appointment in 6 weeks.      NEMO Neves CNP, 6/26/2019 at 2:02 PM

## 2019-06-27 ENCOUNTER — OFFICE VISIT (OUTPATIENT)
Dept: NEUROSURGERY | Age: 54
End: 2019-06-27
Payer: MEDICAID

## 2019-06-27 VITALS
DIASTOLIC BLOOD PRESSURE: 75 MMHG | SYSTOLIC BLOOD PRESSURE: 115 MMHG | HEART RATE: 64 BPM | BODY MASS INDEX: 18.74 KG/M2 | HEIGHT: 74 IN | WEIGHT: 146 LBS

## 2019-06-27 DIAGNOSIS — G89.29 CHRONIC RIGHT SHOULDER PAIN: ICD-10-CM

## 2019-06-27 DIAGNOSIS — M48.02 FORAMINAL STENOSIS OF CERVICAL REGION: Primary | ICD-10-CM

## 2019-06-27 DIAGNOSIS — R20.0 NUMBNESS AND TINGLING IN RIGHT HAND: ICD-10-CM

## 2019-06-27 DIAGNOSIS — M25.511 CHRONIC RIGHT SHOULDER PAIN: ICD-10-CM

## 2019-06-27 DIAGNOSIS — M79.601 RIGHT ARM PAIN: ICD-10-CM

## 2019-06-27 DIAGNOSIS — R20.2 NUMBNESS AND TINGLING IN RIGHT HAND: ICD-10-CM

## 2019-06-27 DIAGNOSIS — M54.2 NECK PAIN: ICD-10-CM

## 2019-06-27 DIAGNOSIS — R29.818 FINE MOTOR IMPAIRMENT: ICD-10-CM

## 2019-06-27 DIAGNOSIS — R29.898 FINE MOTOR IMPAIRMENT: ICD-10-CM

## 2019-06-27 DIAGNOSIS — M50.30 DDD (DEGENERATIVE DISC DISEASE), CERVICAL: ICD-10-CM

## 2019-06-27 PROCEDURE — 99213 OFFICE O/P EST LOW 20 MIN: CPT | Performed by: NURSE PRACTITIONER

## 2019-06-27 NOTE — PROGRESS NOTES
[facility] Neurosurgery  Office Visit      Chief Complaint   Patient presents with    Neck Pain     Review MRI C spine     6/27/2019: Mr. Zoraida Cuenca returns to clinic today to review the repeat MRI cervical spine to follow the hyperintensities of C5 and C6. He denies any new pains and states that he has not had any numbness since the last visit. He received cervical trigger point injections yesterday. He states he is a little sore. He does still have neck and RUE pain and states that his right arm will go numb occasionally but has improved. 5/23/2019: Mr. Zoraida Cuenca returns to clinic today to re-evaluate his neck and RUE pain. He states that he continues to have significant pain. He reports that most of his pain is in his posterior neck and low back. His right arm pain is intermittent. He states that the entire right arm will \"hang limp, like it's dead\". 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 HISTORY OF PRESENT ILLNESS:    Crow Zhao is a 48 y.o. male contract  who presents with posterior neck pain and right arm pain that has been ongoing for about 4-5 years. The pain does radiate into the right trapezial and shoulder region. He also describes his right hand going numb and goes up to his elbow. His pain is mostly located in the neck. The patient complains of numbness of the right hand. He reports that he has chronic left arm numbness due to an old injury. He does drop objects frequently. He does have numbness in the fingertips, trouble using hands to perform fine motor tasks without ataxia. He states that he had a right carpal tunnel release in September of 2018. The patient has underwent a non-operative treatment course that has included:  NSAIDs (mobic, advil)  Muscle Relaxers  (flexeril, zanaflex)  Gabapentin   Opiates (Norco)  Physical Therapy       Of note he does use tobacco and does not take blood thinning medications.                Past Medical reviewed. No pertinent family history. REVIEW OF SYSTEMS:  Review of Systems   Constitutional: Positive for chills, diaphoresis, fever, malaise/fatigue and weight loss. HENT: Positive for congestion, hearing loss, sinus pain, sore throat and tinnitus. Eyes: Positive for photophobia. Respiratory: Positive for cough, sputum production and stridor. Cardiovascular: Positive for claudication and leg swelling. Gastrointestinal: Positive for diarrhea, heartburn, nausea and vomiting. Genitourinary: Positive for flank pain. Musculoskeletal: Positive for back pain, joint pain and neck pain. Skin: Positive for itching. Neurological: Positive for tingling and headaches. Endo/Heme/Allergies: Positive for environmental allergies. Psychiatric/Behavioral: Positive for memory loss. The patient has insomnia. PHYSICAL EXAM:  Vitals:    06/27/19 0915   BP: 115/75   Pulse: 64     Constitutional: appears well-developed and well-nourished. Eyes - conjunctiva normal.  Pupils react to light  Ear, nose, throat -hearing intact to finger rub, No scars, masses, or lesions over external nose or ears, no atrophy oftongue  Neck-symmetric, no masses noted, no jugular vein distension  Respiration- chest wall appears symmetric, good expansion, normal effort without use of accessory muscles  Musculoskeletal - no significantwasting of muscles noted, no bony deformities, gait no gross ataxia  Extremities-no clubbing, cyanosis oredema  Skin - warm, dry, and intact. No rash, erythema, or pallor.   Psychiatric - mood, affect, and behavior appear normal.     Neurologic Examination  Awake, Alert and oriented x 4  Normal speech pattern, following commands    Motor:  RIGHT: hand grasp 4-/5    finger extension 5/5    bicep 4+/5    triceps 4-/5    deltoid 4-/5    LEFT:   hand grasp 4+/5    finger extension 5/5    bicep 5/5    triceps 5/5    deltoid 5/5      Decrease to pinprick sensation entire left arm which is chronic and pronounced towards left. There is bilateral neural foraminal stenosis,   more severe on the left. There is bilateral facet arthropathy. No   significant spinal stenosis. C6, space C6-C7. There is mild diffuse disc bulging, asymmetrically   more pronounced towards left. There is left neural foraminal stenosis. No significant spinal stenosis. C7, space C7-T1. No disc bulging or herniation. There is bilateral   facet arthropathy. The neural foramina spinal canal are patent. There   is a mild superior end compression of vertebra T1 which probably   appears chronic. There is a tiny nodule located in the posterior wall of the   nasopharynx in the midline which may represent a small mucous   cyst/Tornwaldt cyst?. There is a small area of signal abnormality, short T1 and prolonged T2   in the soft tissues opposite and anterior to the vertebrae C5-C6, and   the pharyngoesophageal junction. This may represent a small focal   inflammatory process. The etiology and clinical significance is not   certain. The size and signal of the cervical spinal cord appear normal. No   focal intrinsic abnormality, abnormal signal, enlargement or   expansion. There is mild extrinsic pressure opposite disc C5-C6 due to   disc compression. The visualized chikis and medulla oblongata appear normal. The inferior   pole of the cerebellar tonsils is marginally below the plane of   foramen magnum which may represent a normal variation. This may be   further evaluated with MR imaging of the brain.       Impression   Abnormal bone marrow signal in the vertebral body C5-C6. This may represent diffuse bone marrow edema suggesting a trauma or   inflammatory process. This may also represent metastatic bone marrow   replacement? . Similar changes may also be seen after radiation   exposure. This may be clinically correlated and further evaluated. A   follow-up MR imaging of the cervical spine with contrast enhancement   may be obtained. Cervical spondylosis. A prominent disc osteophyte complexes, facetal arthropathy at multiple   levels and resultant neural foraminal stenosis as detailed above. No   significant spinal stenosis at any level. A mild cerebellar tonsillar ectopy. Signed by Dr Parrish on 12/21/2018 8:20 AM   I have personally reviewed these imagesand my interpretation is: There is DDD throughout  C3-4 mild to moderate bilateral foraminal stenosis L>R  C4-5 moderate right and mild left foraminal  stenosis  C5-6 moderate to severe left, mild to moderate right, mild retrolisthesis that measures 2-3mm, the C5 and C6 vertebral bodies are hyperintense on T2 and STIR imaging, this is consistent with edema or either an inflammatory process, since it is hyperintense on T2 we feel that metastatic disease is less likely. C6-7 mild to moderate left foraminal stenosis    Narrative   Examination. MRI CERVICAL SPINE W WO CONTRAST   History: The patient complains of chronic neck pain with right upper   extremity radiculopathy. Multiple old injuries. No history of   malignancy or surgery. The multiplanar, multisequence MR imaging of the cervical spine is   performed before and after intravenous contrast enhancement. The comparison is made with the previous study dated 12/20/2018. There is no significant interval change. There is a persistent diffuse abnormal signal in the vertebrae C5 and   C6, short T1 and prolonged T2 which may suggest edema/bone contusion. No change in the previous study. This may also represent type I   discogenic degeneration. Similar changes may also be seen with focal   irradiation. It shows mild contrast enhancement. There is no abnormal   enhancement of the disc space. No surrounding soft tissue swelling or   abnormal enhancement. There is loss of height and signal of the disc spaces at multiple   levels, more pronounced at C5-C6 representing a chronic degenerative   process.    There is a normal cardiac malalignment. The vertebral body heights are   normal.   The bone marrow signal of the remaining vertebral bodies and the   posterior elements are normal.   The atlantoaxial articulations are normal. The ligaments are intact. C2, space C2-3. There are uncinate spurs, asymmetrically more towards   the right and there is bilateral facet arthropathy. There is moderate   right neural foraminal stenosis. C3, space C3-4. No disc bulging or herniation. There is bilateral   facet arthropathy. The neural foramina and spinal canal are patent. C4, space C4-5. There is bilateral facet arthropathy. There are small   uncinate spurs. The neural foramina and spinal canal are patent. C5, space C5-6. Moderate diffuse disc bulging. There are small   posterior osteophytes and uncinate spurs. There is bilateral facet   arthropathy. There is bilateral neural foraminal stenosis. No spinal   stenosis. C6, space C6-C7. Mild disc bulging. There is bilateral facet   arthropathy. The neural foramina and spinal canal are patent. C7, space C7-T1. The neural foramina and spinal canal are patent. The size and signal of the cervical spinal cord appear normal. No   focal abnormal signal/myelopathy. No abnormal enhancement. The visualized chikis and medulla oblongata appear normal. The inferior   pole of the cerebellar tonsil is marginally below the plane of foramen   magnum which is stable since the previous study.       Impression   The bone marrow signal changes and mild contrast   enhancement (2 to C5 and C6 probably represent type I discogenic   degeneration. Similar changes may also be seen in an inflammatory   process or focal irradiation. A remote possibility of metastatic   process is not excluded, though l less likely. Cervical spondylosis. The narrowing of the right diaphragm and at several levels as detailed   above.    .   Signed by Dr Wade Leung on 6/19/2019 7:50 AM   I have personally reviewed the images and my interpretation is: The hyperintensities located within the C5 and C6 vertebral bodies are slightly decreased/unchanged when compared to the previous MRI dated 12/20/2018. Overall, indicating that this is a relatively benign process. ASSESSMENT:    Herlinda Yancey is a 48 y.o. male with complaints of neck pain and right arm pain and weakness. ICD-10-CM    1. Foraminal stenosis of cervical region M99.81    2. DDD (degenerative disc disease), cervical M50.30    3. Neck pain M54.2    4. Chronic right shoulder pain M25.511     G89.29    5. Right arm pain M79.601    6. Numbness and tingling in right hand R20.0     R20.2    7. Fine motor impairment R29.818     R29.898        PLAN:  We have again discussed the results of the MRI cervical spine with MR. Meier at length. We explained that the hyper intensities seen within the C5 and C6 vertebral bodies has remained the same if not somewhat decreased. We feel that this is likely a benign process. No neurosurgical intervention at this time.   We recommend continuing pain management.    -Follow up as needed for now     NEMO Xiao

## 2019-06-28 ENCOUNTER — TELEPHONE (OUTPATIENT)
Dept: PAIN MANAGEMENT | Age: 54
End: 2019-06-28

## 2019-06-28 NOTE — TELEPHONE ENCOUNTER
Spoke with pt concerning his injections he had on 06/26. Pt states he was very sore yesterday, but is a lot better today. He states his pain score today is a 4/10 and it was a 9 prior to the injections. He feels she did get to the source of his pain and he has gotten 80% relief thus far. Pt  Has a follow up scheduled.

## 2019-08-08 ENCOUNTER — TELEPHONE (OUTPATIENT)
Dept: PAIN MANAGEMENT | Age: 54
End: 2019-08-08

## 2020-11-20 ENCOUNTER — TELEPHONE (OUTPATIENT)
Dept: NEUROSURGERY | Age: 55
End: 2020-11-20

## 2020-11-24 ENCOUNTER — TELEPHONE (OUTPATIENT)
Dept: NEUROSURGERY | Age: 55
End: 2020-11-24

## 2020-11-30 ENCOUNTER — TELEPHONE (OUTPATIENT)
Dept: NEUROSURGERY | Age: 55
End: 2020-11-30

## 2020-11-30 NOTE — TELEPHONE ENCOUNTER
3rd attempt to call patient to schedule appointment. Unable to leave message, mail box not set up yet.  11/30/20 @ 3:15, cw

## 2021-01-20 ENCOUNTER — OFFICE VISIT (OUTPATIENT)
Dept: NEUROSURGERY | Age: 56
End: 2021-01-20
Payer: MEDICAID

## 2021-01-20 VITALS
RESPIRATION RATE: 18 BRPM | WEIGHT: 148 LBS | BODY MASS INDEX: 18.99 KG/M2 | HEART RATE: 83 BPM | SYSTOLIC BLOOD PRESSURE: 125 MMHG | HEIGHT: 74 IN | DIASTOLIC BLOOD PRESSURE: 83 MMHG

## 2021-01-20 DIAGNOSIS — R51.9 INTRACTABLE HEADACHE, UNSPECIFIED CHRONICITY PATTERN, UNSPECIFIED HEADACHE TYPE: ICD-10-CM

## 2021-01-20 DIAGNOSIS — R51.9 INTRACTABLE HEADACHE, UNSPECIFIED CHRONICITY PATTERN, UNSPECIFIED HEADACHE TYPE: Primary | ICD-10-CM

## 2021-01-20 DIAGNOSIS — R41.3 MEMORY LOSS: ICD-10-CM

## 2021-01-20 DIAGNOSIS — M54.2 NECK PAIN: ICD-10-CM

## 2021-01-20 LAB
ALBUMIN SERPL-MCNC: 4.4 G/DL (ref 3.5–5.2)
ALP BLD-CCNC: 107 U/L (ref 40–130)
ALT SERPL-CCNC: 13 U/L (ref 5–41)
ANION GAP SERPL CALCULATED.3IONS-SCNC: 13 MMOL/L (ref 7–19)
AST SERPL-CCNC: 22 U/L (ref 5–40)
BASOPHILS ABSOLUTE: 0 K/UL (ref 0–0.2)
BASOPHILS RELATIVE PERCENT: 0.7 % (ref 0–1)
BILIRUB SERPL-MCNC: <0.2 MG/DL (ref 0.2–1.2)
BUN BLDV-MCNC: 6 MG/DL (ref 6–20)
C-REACTIVE PROTEIN: 0.05 MG/DL (ref 0–0.5)
CALCIUM SERPL-MCNC: 9.4 MG/DL (ref 8.6–10)
CHLORIDE BLD-SCNC: 104 MMOL/L (ref 98–111)
CO2: 26 MMOL/L (ref 22–29)
CREAT SERPL-MCNC: 0.6 MG/DL (ref 0.5–1.2)
EOSINOPHILS ABSOLUTE: 0.1 K/UL (ref 0–0.6)
EOSINOPHILS RELATIVE PERCENT: 1.3 % (ref 0–5)
GFR AFRICAN AMERICAN: >59
GFR NON-AFRICAN AMERICAN: >60
GLUCOSE BLD-MCNC: 98 MG/DL (ref 74–109)
HCT VFR BLD CALC: 44.8 % (ref 42–52)
HEMOGLOBIN: 14.7 G/DL (ref 14–18)
HIV-1 P24 AG: NORMAL
IMMATURE GRANULOCYTES #: 0 K/UL
LYMPHOCYTES ABSOLUTE: 1.7 K/UL (ref 1.1–4.5)
LYMPHOCYTES RELATIVE PERCENT: 27.6 % (ref 20–40)
MCH RBC QN AUTO: 31.5 PG (ref 27–31)
MCHC RBC AUTO-ENTMCNC: 32.8 G/DL (ref 33–37)
MCV RBC AUTO: 96.1 FL (ref 80–94)
MONOCYTES ABSOLUTE: 0.6 K/UL (ref 0–0.9)
MONOCYTES RELATIVE PERCENT: 9 % (ref 0–10)
NEUTROPHILS ABSOLUTE: 3.7 K/UL (ref 1.5–7.5)
NEUTROPHILS RELATIVE PERCENT: 61.2 % (ref 50–65)
PDW BLD-RTO: 12.9 % (ref 11.5–14.5)
PLATELET # BLD: 239 K/UL (ref 130–400)
PMV BLD AUTO: 10.8 FL (ref 9.4–12.4)
POTASSIUM SERPL-SCNC: 4.5 MMOL/L (ref 3.5–5)
RAPID HIV 1&2: NORMAL
RBC # BLD: 4.66 M/UL (ref 4.7–6.1)
RHEUMATOID FACTOR: <10 IU/ML
SEDIMENTATION RATE, ERYTHROCYTE: 4 MM/HR (ref 0–15)
SODIUM BLD-SCNC: 143 MMOL/L (ref 136–145)
T4 FREE: 0.99 NG/DL (ref 0.93–1.7)
TOTAL PROTEIN: 7.4 G/DL (ref 6.6–8.7)
TSH SERPL DL<=0.05 MIU/L-ACNC: 0.34 UIU/ML (ref 0.27–4.2)
VITAMIN B-12: 999 PG/ML (ref 211–946)
WBC # BLD: 6.1 K/UL (ref 4.8–10.8)

## 2021-01-20 PROCEDURE — 99204 OFFICE O/P NEW MOD 45 MIN: CPT | Performed by: PSYCHIATRY & NEUROLOGY

## 2021-01-20 RX ORDER — GALCANEZUMAB 120 MG/ML
120 INJECTION, SOLUTION SUBCUTANEOUS
Qty: 1 PEN | Refills: 5 | Status: SHIPPED | OUTPATIENT
Start: 2021-01-20 | End: 2021-04-20 | Stop reason: SINTOL

## 2021-01-20 NOTE — PROGRESS NOTES
[facility] Neurology Office Note      Patient:   Junior Winslow  MR#:    [id number]  Account Number:                         YOB: 1965  Date of Evaluation:  1/20/2021  Time of Note:                          10:57 AM  Primary/Referring Physician:  Obi Chandler MD  Consulting Physician:  Hermila Lewis DO    NEW PATIENT CONSULTATION      Chief Complaint   Patient presents with    New Patient    Migraine       HISTORY OF PRESENT ILLNESS    Junior Winslow is a 54y.o. year old male here for headaches. Has been worsening over the last few years. Patient suffered a head injury a few years ago. Has noted chronic headaches since. The pain is typically posterior and will migrate forward. Will last hours to days. Current frequency is weekly to biweekly. Some photophobia, nausea, and scotoma like visual changes noted. He takes ibuprofen prn along with esgic which does seem to help. MRI C-spine abnormalities noted, seen by neurosurgery and felt benign, did not recommend surgery and patient tried pain management which did not overtly help his neck pain. Noting some memory loss as well. Past Medical History:   Diagnosis Date    Broken ribs     Bronchitis     COPD (chronic obstructive pulmonary disease) (HCC)     Fractures     GERD (gastroesophageal reflux disease)     Hepatitis     Hiatal hernia     Marijuana use, episodic     to help with sleeping    Rheumatoid arthritis ([address]     Shingles        Past Surgical History:   Procedure Laterality Date    ARM SURGERY Left     CARPAL TUNNEL RELEASE Right 09/29/2018    HAND SURGERY      with fusion    HERNIA REPAIR         History reviewed. No pertinent family history.     Social History     Socioeconomic History    Marital status:      Spouse name: Not on file    Number of children: Not on file    Years of education: Not on file    Highest education level: Not on file   Occupational History    Not on file   Social Needs  Financial resource strain: Not on file   [place] insecurity     Worry: Not on file     Inability: Not on file   [employer] needs     Medical: Not on file     Non-medical: Not on file   Tobacco Use    Smoking status: Current Every Day Smoker     Packs/day: 2.00     Years: 45.00     Pack years: 90.00     Types: Cigarettes    Smokeless tobacco: Former User   Substance and Sexual Activity    Alcohol use:  Yes    Drug use: Yes     Types: Marijuana     Comment: 3 to 4 weeks ago    Sexual activity: Not on file   Lifestyle    Physical activity     Days per week: Not on file     Minutes per session: Not on file    Stress: Not on file   Relationships    Social connections     Talks on phone: Not on file     Gets together: Not on file     Attends [employer] service: Not on file     Active member of club or organization: Not on file     Attends meetings of clubs or organizations: Not on file     Relationship status: Not on file    Intimate partner violence     Fear of current or ex partner: Not on file     Emotionally abused: Not on file     Physically abused: Not on file     Forced sexual activity: Not on file   Other Topics Concern    Not on file   Social History Narrative    Not on file       Current Outpatient Medications   Medication Sig Dispense Refill    cyclobenzaprine (FLEXERIL) 10 MG tablet Take 1 tablet by mouth 2 times daily as needed for Muscle spasms 30 tablet 1    VENTOLIN  (90 Base) MCG/ACT inhaler Inhale 2 puffs into the lungs every 6 hours as needed      loratadine (CLARITIN) 10 MG tablet Take 1 tablet by mouth daily      ibuprofen (ADVIL;MOTRIN) 600 MG tablet Take 600 mg by mouth as needed for Pain      Cream Base CREA [place] Pain Cream #4 Add Gabapentin 6% Apply 1-2 pumps to affected area 3-4- [id number] (Patient not taking: Reported on 1/20/2021) 300 g 5    meloxicam (MOBIC) 7.5 MG tablet Take 1 tablet by mouth daily (Patient not taking: Reported on 1/20/2021) 30 tablet 0  Pantoprazole Sodium (PROTONIX PO) Take by mouth 2 times daily       No current facility-administered medications for this visit. ALLERGIES   No Known Allergies      REVIEW OF SYSTEMS    Constitutional: []Fever []Sweats []Chills [] Recent Injury   [x] Denies all unless marked  HENT:[x]Headache  [x] Head Injury  [] Sore Throat  [] Ear Pain  [] Dizziness [] Hearing Loss   [] Denies all unless marked  Spine:  [] Neck pain  [] Back pain  [] Sciaticia  [x] Denies all unless marked  Cardiovascular:[]Chest Pain []Palpitations [] Heart Disease  [x] Denies all unless marked  Pulmonary: []Shortness of Breath []Cough   [x] Denies all unless marked  Gastrointestinal:  []Abdominal Pain  []Blood in Stool  []Diarrhea []Constipation []Nausea  []Vomiting  [x] Denies all unless marked  Genitourinary:  [] Dysuria [] Frequency  [] Incontinence [] Urgency   [x] Denies all unless marked  Musculoskeletal: [] Arthralgia  [] Myalgias [] Muscle cramps  [] Muscle twitches   [x] Denies all unless marked   Extremities:   [] Pain   [] Swelling   [x] Denies all unless marked  Skin:[] Rash  [] Color Change  [x] Denies all unless marked  Neurological:[] Visual Disturbance [] Double Vision [] Slurred Speech [] Trouble swallowing  [] Vertigo [] Tingling [] Numbness [] Weakness [] Loss of Balance   [] Loss of Consciousness [] Memory Loss [] Seizures  [x] Denies all unless marked  Psychiatric/Behavioral:[] Depression [] Anxiety  [x] Denies all unless marked  Sleep: []  Insomnia [] Sleep Disturbance [] Snoring [] Restless Legs [] Daytime Sleepiness [] Sleep Apnea  [x] Denies all unless marked    I have reviewed the above ROS with the patient and agree with the ROS as documented above.          PHYSICAL EXAM    Constitutional -   /83   Pulse 83   Resp 18   Ht 6' 2\" (1.88 m)   Wt 148 lb (67.1 kg)   BMI 19.00 kg/m²   General appearance: No acute distress   EYES -   Conjunctiva normal  Pupillary exam as below, see CN exam in the neurologic exam  ENT-    No scars, masses, or lesions over external nose or ears  Hearing normal bilaterally to finger rub  Cardiovascular -   No clubbing, cyanosis, or edema   Pulmonary-   Good expansion, normal effort without use of accessory muscles  Musculoskeletal -   No significant wasting of muscles noted  Gait as below, see gait exam in the neurologic exam  Muscle strength, tone, stability as below. No bony deformities  Skin -   Warm, dry, and intact to inspection and palpation. No rash, erythema, or pallor  Psychiatric -   Mood, affect, and behavior appear normal    Memory as below see mental status examination in the neurologic exam    NEUROLOGICAL EXAM    Mental status   [x]Awake, alert, oriented   [x]Affect attention and concentration appear appropriate  [x]Recent and remote memory appears unremarkable  [x]Speech normal without dysarthria or aphasia, comprehension and repetition intact. COMMENTS:    Cranial Nerves [x]No VF deficit to confrontation,  no papilledema on fundoscopic exam.  [x]PERRLA, EOMI, no nystagmus, conjugate eye movements, no ptosis  [x]Face symmetric  [x]Facial sensation intact  [x]Tongue midline no atrophy or fasciculations present  [x]Palate midline, hearing to finger rub normal bilaterally  [x]Shoulder shrug and SCM testing normal bilaterally  COMMENTS:   Motor   [x]5/5 strength x 4 extremities  [x]Normal bulk and tone  [x]No tremor present  [x]No rigidity or bradykinesia noted  COMMENTS:   Sensory  [x]Sensation intact to light touch, pin prick, vibration, and proprioception BLE  []Sensation intact to light touch, pin prick, vibration, and proprioception BUE  COMMENTS:   Coordination [x]FTN normal bilaterally   []HTS normal bilaterally  []CALVIN normal bilaterally.    COMMENTS:   Reflexes  [x]Symmetric and non-pathological  [x]Toes down going bilaterally  [x]No clonus present  COMMENTS:   Gait                  [x]Normal steady gait    []Ataxic    []Spastic     []Magnetic

## 2021-01-21 ENCOUNTER — TELEPHONE (OUTPATIENT)
Dept: NEUROLOGY | Age: 56
End: 2021-01-21

## 2021-01-21 LAB — RPR: NORMAL

## 2021-01-23 LAB
ANA IGG, ELISA: NORMAL
ARSENIC BLOOD: <10 UG/L
LEAD LEVEL BLOOD: 2.7 UG/DL
MERCURY BLOOD: <2.5 UG/L

## 2021-02-26 ENCOUNTER — TELEPHONE (OUTPATIENT)
Dept: NEUROLOGY | Age: 56
End: 2021-02-26

## 2021-02-26 DIAGNOSIS — F41.9 ANXIETY: Primary | ICD-10-CM

## 2021-02-26 NOTE — TELEPHONE ENCOUNTER
Patient left message stating that he had a bad reaction to [facility]. Patient states he had excessive vomiting and constipation, then red itchy whelps two days later. It lasted about 4 days in total. He is better now and has no residual side effects. Patient took benadryl and that treated the hives. Patient also wants his MRI and MRA completed at [facility] I forwarded that information to [facility] so she could get that precerted and scheduled. She will call patient back.

## 2021-03-01 RX ORDER — DIAZEPAM 5 MG/1
TABLET ORAL
Qty: 1 TABLET | Refills: 0 | Status: SHIPPED | OUTPATIENT
Start: 2021-03-01 | End: 2021-04-01

## 2021-03-01 NOTE — TELEPHONE ENCOUNTER
Called patient per Dr Camelia Whiting to let him know to stop Emgality. Also mri scheduled for Thursday at [facility], needed something to calm him prior.

## 2021-04-20 ENCOUNTER — OFFICE VISIT (OUTPATIENT)
Dept: NEUROSURGERY | Age: 56
End: 2021-04-20
Payer: MEDICAID

## 2021-04-20 VITALS
HEIGHT: 74 IN | WEIGHT: 148 LBS | DIASTOLIC BLOOD PRESSURE: 68 MMHG | SYSTOLIC BLOOD PRESSURE: 122 MMHG | BODY MASS INDEX: 18.99 KG/M2 | HEART RATE: 84 BPM | OXYGEN SATURATION: 98 %

## 2021-04-20 DIAGNOSIS — M54.2 NECK PAIN: ICD-10-CM

## 2021-04-20 DIAGNOSIS — R41.3 MEMORY LOSS: ICD-10-CM

## 2021-04-20 DIAGNOSIS — R51.9 INTRACTABLE HEADACHE, UNSPECIFIED CHRONICITY PATTERN, UNSPECIFIED HEADACHE TYPE: Primary | ICD-10-CM

## 2021-04-20 PROCEDURE — 99214 OFFICE O/P EST MOD 30 MIN: CPT | Performed by: PSYCHIATRY & NEUROLOGY

## 2021-04-20 NOTE — PROGRESS NOTES
[facility] Neurology Office Note      Patient:   Navid Go  MR#:    [id number]  Account Number:                         YOB: 1965  Date of Evaluation:  4/20/2021  Time of Note:                          10:08 AM  Primary/Referring Physician:  Nicci Chandler MD  Consulting Physician:  Tavon Gaona DO    FOLLOW UP VISIT      Chief Complaint   Patient presents with    Headache     3 Month follow up a little better        HISTORY OF PRESENT ILLNESS    Navid Go is a 54y.o. year old male here for headaches. Doing better overall, only a couple headaches since last seen. Had an allergic reaction to the [facility], now off. Patient suffered a head injury a few years ago. Has noted chronic headaches since. The pain is typically posterior and will migrate forward. Will last hours to days. Current frequency is weekly to biweekly. Some photophobia, nausea, and scotoma like visual changes noted. He takes ibuprofen prn along with esgic which does seem to help. MRI C-spine abnormalities noted, seen by neurosurgery and felt benign, did not recommend surgery and patient tried pain management which did not overtly help his neck pain. Noting some memory loss as well, unchanged. MRI brain with non-specific white matter changes, otherwise negative. Past Medical History:   Diagnosis Date    Broken ribs     Bronchitis     COPD (chronic obstructive pulmonary disease) (HCC)     Fractures     GERD (gastroesophageal reflux disease)     Hepatitis     Hiatal hernia     Marijuana use, episodic     to help with sleeping    Rheumatoid arthritis ([address]     Shingles        Past Surgical History:   Procedure Laterality Date    ARM SURGERY Left     CARPAL TUNNEL RELEASE Right 09/29/2018    HAND SURGERY      with fusion    HERNIA REPAIR         History reviewed. No pertinent family history.     Social History     Socioeconomic History    Marital status:      Spouse name: Not on file  Number of children: Not on file    Years of education: Not on file    Highest education level: Not on file   Occupational History    Not on file   Social Needs    Financial resource strain: Not on file    Food insecurity     Worry: Not on file     Inability: Not on file    Transportation needs     Medical: Not on file     Non-medical: Not on file   Tobacco Use    Smoking status: Current Every Day Smoker     Packs/day: 2.00     Years: 45.00     Pack years: 90.00     Types: Cigarettes    Smokeless tobacco: Former User   Substance and Sexual Activity    Alcohol use:  Yes    Drug use: Yes     Types: Marijuana     Comment: 3 to 4 weeks ago    Sexual activity: Not Currently   Lifestyle    Physical activity     Days per week: Not on file     Minutes per session: Not on file    Stress: Not on file   Relationships    Social connections     Talks on phone: Not on file     Gets together: Not on file     Attends Scientologist service: Not on file     Active member of club or organization: Not on file     Attends meetings of clubs or organizations: Not on file     Relationship status: Not on file    Intimate partner violence     Fear of current or ex partner: Not on file     Emotionally abused: Not on file     Physically abused: Not on file     Forced sexual activity: Not on file   Other Topics Concern    Not on file   Social History Narrative    Not on file       Current Outpatient Medications   Medication Sig Dispense Refill    cyclobenzaprine (FLEXERIL) 10 MG tablet Take 1 tablet by mouth 2 times daily as needed for Muscle spasms 30 tablet 1    Cream Base CREA [place] Pain Cream #4 Add Gabapentin 6% Apply 1-2 pumps to affected area 3-4- [id number] 300 g 5    VENTOLIN  (90 Base) MCG/ACT inhaler Inhale 2 puffs into the lungs every 6 hours as needed      loratadine (CLARITIN) 10 MG tablet Take 1 tablet by mouth daily      meloxicam (MOBIC) 7.5 MG tablet Take 1 tablet by mouth daily 30 tablet 0    Pantoprazole Sodium (PROTONIX PO) Take by mouth 2 times daily      ibuprofen (ADVIL;MOTRIN) 600 MG tablet Take 600 mg by mouth as needed for Pain       No current facility-administered medications for this visit. ALLERGIES   No Known Allergies      REVIEW OF SYSTEMS    Constitutional: []Fever []Sweat []Chills [] Recent Injury [x] Denies all unless marked  HEENT:[x]Headache  [] Head Injury/Hearing Loss  [] Sore Throat  [] Ear Ache/Dizziness  [x] Denies all unless marked  Spine:  [x] Neck pain  [x] Back pain  [x] Sciaticia  [x] Denies all unless marked  Cardiovascular:[]Heart Disease []Chest Pain [] Palpitations  [x] Denies all unless marked  Pulmonary: []Shortness of Breath [x]Cough   [x] Denies all unless marke  Gastrointestinal: []Nausea  []Vomiting  []Abdominal Pain  []Constipation  []Diarrhea  []Dark Bloody Stools  [x] Denies all unless marked  Psychiatric/Behavioral:[] Depression [x] Anxiety [x] Denies all unless marked  Genitourinary:   [] Frequency  [] Urgency  [] Incontinence [] Pain with Urination  [x] Denies all unless marked  Extremities: [x]Pain  []Swelling  [x] Denies all unless marked  Musculoskeletal: [] Muscle Pain  [] Joint Pain  [] Arthritis [] Muscle Cramps [] Muscle Twitches  [x] Denies all unless marked  Sleep: [x] Insomnia [] Snoring [] Restless Legs [] Sleep Apnea  [] Daytime Sleepiness  [x] Denies all unless marked  Skin:[] Rash [] Skin Discoloration [x] Denies all unless marked   Neurological: []Visual Disturbance/Memory Loss [] Loss of Balance [] Slurred Speech/Weakness [] Seizures  [] Vertigo/Dizziness [x] Denies all unless marked     I have reviewed the above ROS with the patient and agree with the ROS as documented above.          PHYSICAL EXAM    Constitutional -   /68   Pulse 84   Ht 6' 2\" (1.88 m)   Wt 148 lb (67.1 kg)   SpO2 98%   BMI 19.00 kg/m²   General appearance: No acute distress   EYES -   Conjunctiva normal  Pupillary exam as below, see CN exam in the neurologic exam  ENT-    No scars, masses, or lesions over external nose or ears  Hearing normal bilaterally to finger rub  Cardiovascular -   No clubbing, cyanosis, or edema   Pulmonary-   Good expansion, normal effort without use of accessory muscles  Musculoskeletal -   No significant wasting of muscles noted  Gait as below, see gait exam in the neurologic exam  Muscle strength, tone, stability as below. No bony deformities  Skin -   Warm, dry, and intact to inspection and palpation. No rash, erythema, or pallor  Psychiatric -   Mood, affect, and behavior appear normal    Memory as below see mental status examination in the neurologic exam    NEUROLOGICAL EXAM    Mental status   [x]Awake, alert, oriented   [x]Affect attention and concentration appear appropriate  [x]Recent and remote memory appears unremarkable  [x]Speech normal without dysarthria or aphasia, comprehension and repetition intact. COMMENTS:    Cranial Nerves [x]No VF deficit to confrontation,  no papilledema on fundoscopic exam.  [x]PERRLA, EOMI, no nystagmus, conjugate eye movements, no ptosis  [x]Face symmetric  [x]Facial sensation intact  [x]Tongue midline no atrophy or fasciculations present  [x]Palate midline, hearing to finger rub normal bilaterally  [x]Shoulder shrug and SCM testing normal bilaterally  COMMENTS:   Motor   [x]5/5 strength x 4 extremities  [x]Normal bulk and tone  [x]No tremor present  [x]No rigidity or bradykinesia noted  COMMENTS:   Sensory  [x]Sensation intact to light touch, pin prick, vibration, and proprioception BLE  []Sensation intact to light touch, pin prick, vibration, and proprioception BUE  COMMENTS:   Coordination [x]FTN normal bilaterally   []HTS normal bilaterally  []CALVIN normal bilaterally.    COMMENTS:   Reflexes  [x]Symmetric and non-pathological  [x]Toes down going bilaterally  [x]No clonus present  COMMENTS:   Gait                  [x]Normal steady gait    []Ataxic    []Spastic     []Magnetic []Shuffling  COMMENTS:       LABS RECORD AND IMAGING REVIEW (As below and per HPI)    Records reviewed. MRI C-spine repeat stable. Neurosurgery records reviewed. MRI brain reviewed, mild nonspecific white matter changes noted. Labs reviewed, negative. ASSESSMENT:    Herlinda Murry is a 54y.o. year old male here for chronic headaches, prior TBI, memory loss, neck pain, abnormal C-spine imaging, neurosurgery following. MRI brain largely negative. Headaches improved. Could not tolerate Emgality. PLAN:  1. Off Emgality given possible allergic reaction, hold off on preventative for now given improvement, consider Botox if worsens. 2.  Continue esgic prn sparingly for abortive treatment. Limit OTC pain meds to avoid rebound    3.   Follow up with neurosurgery as directed for abnormal C-spine imaging, felt benign    Kerwin Rivera, DO  Board Certified Neurology

## 2023-08-24 ENCOUNTER — TELEPHONE (OUTPATIENT)
Dept: NEUROLOGY | Age: 58
End: 2023-08-24

## 2023-08-24 NOTE — TELEPHONE ENCOUNTER
Received referral from Dr. Diane Corea office. Pt is est. Last seen [facility] 4/2021. Called patient and scheduled him NTP with JEET Bill. Pt is scheduled for 9/26 @ 1.  He voiced understanding,

## 2023-09-26 ENCOUNTER — OFFICE VISIT (OUTPATIENT)
Dept: NEUROLOGY | Age: 58
End: 2023-09-26
Payer: MEDICAID

## 2023-09-26 VITALS
BODY MASS INDEX: 19 KG/M2 | SYSTOLIC BLOOD PRESSURE: 143 MMHG | OXYGEN SATURATION: 98 % | HEIGHT: 74 IN | HEART RATE: 89 BPM | DIASTOLIC BLOOD PRESSURE: 92 MMHG

## 2023-09-26 DIAGNOSIS — M54.2 CERVICAL SPINE PAIN: Primary | ICD-10-CM

## 2023-09-26 DIAGNOSIS — G45.9 TIA (TRANSIENT ISCHEMIC ATTACK): ICD-10-CM

## 2023-09-26 DIAGNOSIS — R29.898 RIGHT ARM WEAKNESS: ICD-10-CM

## 2023-09-26 DIAGNOSIS — R20.2 ARM PARESTHESIA, RIGHT: ICD-10-CM

## 2023-09-26 PROCEDURE — 99214 OFFICE O/P EST MOD 30 MIN: CPT | Performed by: NURSE PRACTITIONER

## 2023-09-26 RX ORDER — HYDROGEN PEROXIDE 2.65 ML/100ML
LIQUID ORAL; TOPICAL
COMMUNITY
Start: 2023-08-03

## 2023-09-26 NOTE — PROGRESS NOTES
[facility] Neurology Office Note      Patient:   Leopoldo Leff  MR#:    [id number]  Account Number:                         YOB: 1965  Date of Evaluation:  9/26/2023  Time of Note:                          1:45 PM  Primary/Referring Physician:  Porfirio Mccray   Consulting Physician:  Cloria Nageotte, APRN    FOLLOW UP    Chief Complaint   Patient presents with    Follow-up     NTP [facility] pt 4/2021 dx TIA        HISTORY OF PRESENT ILLNESS    Leopoldo Leff is a 62y.o. year old male here for evaluation of TIA. States this occurred about 6 weeks ago. He states he was found on the couch confused, eyes rolled back in his head. Was seen at [facility] and completed MRI brain, he reports it did not show a stroke. States that this time he had slurred speech and felt that he knew what he wanted to say but could not speak. Was having generalized weakness and what is described as possible dystonias to BUE. He states he returned to baseline that same day. He also reports about 2 weeks ago he had a fall and struck his head. Reports he fell due to getting tripped by his dog, struck frontal head into corner of metal handrail on steps. States he was seeing and hearing things following this fall, this has completely resolved. He has chronic tinnitus. Denies headaches. Has underlying restless leg disorder. He has chronic headaches. Had an allergic reaction to [facility] in the past.  There is some associated photophobia, nausea, and scotoma like visual changes. Using ibuprofen as needed along with esgic with benefit. Has chronic ongoing neck pain. Weakness to upper extremities at times with paresthesias.      Past Medical History:   Diagnosis Date    Broken ribs     Bronchitis     COPD (chronic obstructive pulmonary disease) (HCC)     Fractures     GERD (gastroesophageal reflux disease)     Hepatitis     Hiatal hernia     Marijuana use, episodic     to help with sleeping    Rheumatoid arthritis ([address])

## 2023-09-26 NOTE — PROGRESS NOTES
REVIEW OF SYSTEMS    Constitutional: []Fever []Sweat []Chills [x] Recent Injury [x] Denies all unless marked  HEENT:[x]Headache  [] Head Injury/Hearing Loss  [] Sore Throat  [] Ear Ache/Dizziness  [x] Denies all unless marked  Spine:  [x] Neck pain  [] Back pain  [] Sciaticia  [x] Denies all unless marked  Cardiovascular:[]Heart Disease []Chest Pain [] Palpitations  [x] Denies all unless marked  Pulmonary: []Shortness of Breath []Cough   [x] Denies all unless marke  Gastrointestinal: [x]Nausea  []Vomiting  []Abdominal Pain  []Constipation  []Diarrhea  []Dark Bloody Stools  [x] Denies all unless marked  Psychiatric/Behavioral:[x] Depression [x] Anxiety [x] Denies all unless marked  Genitourinary:   [] Frequency  [] Urgency  [] Incontinence [] Pain with Urination  [x] Denies all unless marked  Extremities: []Pain  []Swelling  [x] Denies all unless marked  Musculoskeletal: [x] Muscle Pain  [x] Joint Pain  [] Arthritis [] Muscle Cramps [] Muscle Twitches  [x] Denies all unless marked  Sleep: [] Insomnia [] Snoring [] Restless Legs [] Sleep Apnea  [] Daytime Sleepiness  [x] Denies all unless marked  Skin:[] Rash [] Skin Discoloration [x] Denies all unless marked   Neurological: [x]Visual Disturbance/Memory Loss [x] Loss of Balance [x] Slurred Speech/Weakness [] Seizures  [x] Vertigo/Dizziness [x] Denies all unless marked

## 2023-11-07 ENCOUNTER — HOSPITAL ENCOUNTER (OUTPATIENT)
Dept: NON INVASIVE DIAGNOSTICS | Age: 58
Discharge: HOME | End: 2023-11-07
Payer: MEDICAID

## 2023-11-07 ENCOUNTER — HOSPITAL ENCOUNTER (OUTPATIENT)
Dept: MRI IMAGING | Age: 58
Discharge: HOME | End: 2023-11-07
Payer: MEDICAID

## 2023-11-07 DIAGNOSIS — R20.2 ARM PARESTHESIA, RIGHT: ICD-10-CM

## 2023-11-07 DIAGNOSIS — R29.898 RIGHT ARM WEAKNESS: ICD-10-CM

## 2023-11-07 DIAGNOSIS — G45.9 TIA (TRANSIENT ISCHEMIC ATTACK): ICD-10-CM

## 2023-11-07 DIAGNOSIS — R93.7 ABNORMAL MRI, CERVICAL SPINE: ICD-10-CM

## 2023-11-07 DIAGNOSIS — M54.2 CERVICAL SPINE PAIN: Primary | ICD-10-CM

## 2023-11-07 DIAGNOSIS — M54.2 CERVICAL SPINE PAIN: ICD-10-CM

## 2023-11-07 PROCEDURE — 6360000004 HC RX CONTRAST MEDICATION: Performed by: NURSE PRACTITIONER

## 2023-11-07 PROCEDURE — A9577 INJ MULTIHANCE: HCPCS | Performed by: NURSE PRACTITIONER

## 2023-11-07 PROCEDURE — 93246 EXT ECG>7D<15D RECORDING: CPT

## 2023-11-07 PROCEDURE — 72156 MRI NECK SPINE W/O & W/DYE: CPT

## 2023-11-07 RX ADMIN — GADOBENATE DIMEGLUMINE 11 ML: 529 INJECTION, SOLUTION INTRAVENOUS at 10:19

## 2023-11-09 ENCOUNTER — TELEPHONE (OUTPATIENT)
Dept: NEUROSURGERY | Age: 58
End: 2023-11-09

## 2023-11-09 NOTE — TELEPHONE ENCOUNTER
[facility] Neurosurgery New Patient Questionnaire    Diagnosis/Reason for Referral?     DX:   M54.2 (ICD-10-CM) - Cervical spine pain   R29.898 (ICD-10-CM) - Right arm weakness   R20.2 (ICD-10-CM) - Arm paresthesia, right   R93.7 (ICD-10-CM) - Abnormal MRI, cervical spine       2. Who is completing questionnaire? Patient X Caregiver Family      3. Has the patient had any previous spinal/brain surgeries? NO         A. If yes, what is the name of the facility in which the surgery was performed? B. Procedure/Surgery performed? C. Who was the surgeon? D. When was the surgery? MM/YY       E. Did the patient improve after the surgery? 4. Is this a second opinion? If yes, Dr. Jemma Crowell would like to review patient first before making the appointment. 5. Have MRI Images been obtain within the last year? Yes X  No      XR  CT     If yes, where was the imaging performed? [facility]    If yes, what part of the body? Lumbar  Cervical X Thoracic  Brain     If yes, when was it obtained? 11/7/2023    Note: if the scan was performed at a facility other than [facility], the disc will need to be brought to the appointment or we need to reach out to obtain the disc. A. Was the patient instructed to provide the disc? Yes   No X      8. Has the patient had a NCV/EMG within the last year? Yes  No X     If yes, where was it performed and date? MM/YY  Location:      9. Has the patient been to Physical Therapy? Yes  No X     If yes, what location, how long attended, and last visit? Location:        Therapy Lasted:    Date of Last Visit:      10. Has the patient been to Pain Management? Yes  No X     If yes, what location and last visit     Location:   Last Visit:   Is it helping?

## 2023-12-12 ENCOUNTER — OFFICE VISIT (OUTPATIENT)
Dept: NEUROSURGERY | Age: 58
End: 2023-12-12
Payer: MEDICAID

## 2023-12-12 ENCOUNTER — OFFICE VISIT (OUTPATIENT)
Dept: NEUROLOGY | Age: 58
End: 2023-12-12
Payer: MEDICAID

## 2023-12-12 VITALS
RESPIRATION RATE: 18 BRPM | WEIGHT: 150 LBS | DIASTOLIC BLOOD PRESSURE: 84 MMHG | SYSTOLIC BLOOD PRESSURE: 114 MMHG | BODY MASS INDEX: 19.25 KG/M2 | OXYGEN SATURATION: 97 % | HEIGHT: 74 IN | HEART RATE: 91 BPM

## 2023-12-12 VITALS
SYSTOLIC BLOOD PRESSURE: 114 MMHG | BODY MASS INDEX: 19.24 KG/M2 | WEIGHT: 149.91 LBS | DIASTOLIC BLOOD PRESSURE: 84 MMHG | HEIGHT: 74 IN | HEART RATE: 91 BPM

## 2023-12-12 DIAGNOSIS — R20.2 ARM PARESTHESIA, RIGHT: ICD-10-CM

## 2023-12-12 DIAGNOSIS — M50.30 DDD (DEGENERATIVE DISC DISEASE), CERVICAL: ICD-10-CM

## 2023-12-12 DIAGNOSIS — M54.31 SCIATICA OF RIGHT SIDE: ICD-10-CM

## 2023-12-12 DIAGNOSIS — R29.898 RIGHT ARM WEAKNESS: ICD-10-CM

## 2023-12-12 DIAGNOSIS — M54.2 NECK PAIN: ICD-10-CM

## 2023-12-12 DIAGNOSIS — M54.2 CERVICAL SPINE PAIN: ICD-10-CM

## 2023-12-12 DIAGNOSIS — M48.02 FORAMINAL STENOSIS OF CERVICAL REGION: Primary | ICD-10-CM

## 2023-12-12 DIAGNOSIS — M62.522 ATROPHY OF MUSCLE OF LEFT UPPER ARM: ICD-10-CM

## 2023-12-12 DIAGNOSIS — Z79.899 MEDICATION MANAGEMENT: ICD-10-CM

## 2023-12-12 DIAGNOSIS — M54.41 ACUTE MIDLINE LOW BACK PAIN WITH RIGHT-SIDED SCIATICA: ICD-10-CM

## 2023-12-12 DIAGNOSIS — M48.02 FORAMINAL STENOSIS OF CERVICAL REGION: ICD-10-CM

## 2023-12-12 DIAGNOSIS — G45.9 TIA (TRANSIENT ISCHEMIC ATTACK): Primary | ICD-10-CM

## 2023-12-12 DIAGNOSIS — Z79.899 MEDICATION MANAGEMENT: Primary | ICD-10-CM

## 2023-12-12 PROCEDURE — 99204 OFFICE O/P NEW MOD 45 MIN: CPT | Performed by: NEUROLOGICAL SURGERY

## 2023-12-12 PROCEDURE — 99214 OFFICE O/P EST MOD 30 MIN: CPT | Performed by: NURSE PRACTITIONER

## 2023-12-12 RX ORDER — METHYLPREDNISOLONE 4 MG/1
TABLET ORAL
Qty: 1 KIT | Refills: 0 | Status: SHIPPED | OUTPATIENT
Start: 2023-12-12 | End: 2023-12-18

## 2023-12-12 RX ORDER — CYCLOBENZAPRINE HCL 10 MG
10 TABLET ORAL 2 TIMES DAILY PRN
Qty: 60 TABLET | Refills: 5 | Status: SHIPPED | OUTPATIENT
Start: 2023-12-12 | End: 2024-01-11

## 2023-12-12 ASSESSMENT — ENCOUNTER SYMPTOMS
RESPIRATORY NEGATIVE: 1
EYES NEGATIVE: 1
GASTROINTESTINAL NEGATIVE: 1

## 2023-12-12 NOTE — PROGRESS NOTES
[facility] Neurology Office Note      Patient:   Rashel Lanier  MR#:    [id number]  Account Number:                         YOB: 1965  Date of Evaluation:  12/13/2023  Time of Note:                          3:08 PM  Primary/Referring Physician:  Hayden Silva   Consulting Physician:  NEMO Butcher    FOLLOW UP    Chief Complaint   Patient presents with    Follow-up     TIA       HISTORY OF PRESENT ILLNESS    Rashel Lanier is a 62y.o. year old male here for follow of TIA. No new strokelike symptoms. MRI brain results received from [facility], no CVA. CTA head and neck without significant stenosis. Zio patch completed that was largely normal.  MRI cervical spine completed with multiple abnormalities. Evaluated by neurosurgery and could potentially be surgical candidate but he is unable to take off work for that amount of time. He will be starting physical therapy. He has had benefit from Flexeril. TIA occurred in August. He states he was found on the couch confused, eyes rolled back in his head. Was seen at [facility] and completed MRI brain, he reports it did not show a stroke. States that this time he had slurred speech and felt that he knew what he wanted to say but could not speak. Was having generalized weakness and what is described as possible dystonias to BUE. He states he returned to baseline that same day. He also reports about 2 weeks ago he had a fall and struck his head. Reports he fell due to getting tripped by his dog, struck frontal head into corner of metal handrail on steps. States he was seeing and hearing things following this fall, this has completely resolved. He has chronic tinnitus. Denies headaches. Has underlying restless leg disorder. He has chronic headaches. Had an allergic reaction to [facility] in the past.  There is some associated photophobia, nausea, and scotoma like visual changes.   Using ibuprofen as needed along with esgic with

## 2023-12-12 NOTE — PATIENT INSTRUCTIONS
Gabapentin  300 mg three times daily     Start with one pill nightly for 7 days   Increase to one pill in the morning and one pill nightly for 7 days   If tolerating well can increase to one in the morning, one in the midday, one in the evening.

## 2023-12-12 NOTE — PROGRESS NOTES
[facility] Neurosurgery  Office Visit      Chief Complaint   Patient presents with    New Patient     Establishing care     Results     MRI Cervical Spine (11/7/2023)    Neck Pain     Patient states he has had pain gradually come on over the years and it has worsened in the past few months. He states the pain does radiate into his BUE but more on the right side. He states he is taking Tylenol and Flexeril to help manage the pain. He states he also smokes marijuana to help ease the pain. Numbness     Patient states he does have tingling in his right hand. HISTORY OF PRESENT ILLNESS:    Flo Jarquin is a 62 y.o. male with a hx of COPD, RA who we initially evaluated on 3/22/2019 for cervical spine issues who presents with continuous neck and arm pain. The pain does radiate into the bilateral trapezius, posterior aspect of the right arm. He does have LUE numbness, however, talks about how he almost cut his arm off as a child and has always been numb. His pain is mostly located in the neck. The patient complains of numbness and paresthesias of the entire RIGHT hand. He does have numbness in the fingertips, trouble using hands to perform fine motor tasks. States it took him 30 minutes to button his shirt today. Denies unsteadiness when ambulating. He also complains of low back and RIGHT groin pain along with the posterolateral thigh pain that has been present for about 2 months. The patient has underwent a non-operative treatment course that has included:  NSAIDs  Muscle Relaxers (flexeril)  Opiates (Norco in the past)  Oral Steroids  Physical Therapy (in the past)  Pain management injections (did not work, did not like them)        Of note he does use tobacco and does take blood thinning medications (ASA).                Past Medical History:   Diagnosis Date    Broken ribs     Bronchitis     COPD (chronic obstructive pulmonary disease) (HCC)     Fractures     GERD (gastroesophageal

## 2024-01-04 ENCOUNTER — HOSPITAL ENCOUNTER (OUTPATIENT)
Dept: NON INVASIVE DIAGNOSTICS | Age: 59
Discharge: HOME | End: 2024-01-04
Payer: MEDICAID

## 2024-01-04 DIAGNOSIS — G45.9 TIA (TRANSIENT ISCHEMIC ATTACK): ICD-10-CM

## 2024-01-04 PROCEDURE — 6360000004 HC RX CONTRAST MEDICATION: Performed by: NURSE PRACTITIONER

## 2024-01-04 PROCEDURE — C8929 TTE W OR WO FOL WCON,DOPPLER: HCPCS

## 2024-01-04 RX ADMIN — PERFLUTREN 1.5 ML: 6.52 INJECTION, SUSPENSION INTRAVENOUS at 11:38

## 2024-02-13 ENCOUNTER — OFFICE VISIT (OUTPATIENT)
Dept: NEUROSURGERY | Age: 59
End: 2024-02-13
Payer: MEDICAID

## 2024-02-13 VITALS
BODY MASS INDEX: 19.12 KG/M2 | RESPIRATION RATE: 18 BRPM | WEIGHT: 149 LBS | OXYGEN SATURATION: 98 % | HEIGHT: 74 IN | HEART RATE: 85 BPM | SYSTOLIC BLOOD PRESSURE: 140 MMHG | DIASTOLIC BLOOD PRESSURE: 82 MMHG

## 2024-02-13 DIAGNOSIS — M62.522 ATROPHY OF MUSCLE OF LEFT UPPER ARM: ICD-10-CM

## 2024-02-13 DIAGNOSIS — R20.2 ARM PARESTHESIA, RIGHT: ICD-10-CM

## 2024-02-13 DIAGNOSIS — M50.30 DDD (DEGENERATIVE DISC DISEASE), CERVICAL: ICD-10-CM

## 2024-02-13 DIAGNOSIS — M54.41 ACUTE MIDLINE LOW BACK PAIN WITH RIGHT-SIDED SCIATICA: ICD-10-CM

## 2024-02-13 DIAGNOSIS — M48.02 FORAMINAL STENOSIS OF CERVICAL REGION: Primary | ICD-10-CM

## 2024-02-13 DIAGNOSIS — M54.2 NECK PAIN: ICD-10-CM

## 2024-02-13 PROCEDURE — 99213 OFFICE O/P EST LOW 20 MIN: CPT | Performed by: NEUROLOGICAL SURGERY

## 2024-02-13 NOTE — PROGRESS NOTES
Review of Systems   Constitutional: Negative.    HENT: Negative.     Eyes: Negative.    Respiratory: Negative.     Cardiovascular: Negative.    Gastrointestinal: Negative.    Genitourinary: Negative.    Musculoskeletal:  Positive for back pain, joint pain, myalgias and neck pain.   Skin: Negative.    Neurological:  Positive for tingling.   Endo/Heme/Allergies: Negative.    Psychiatric/Behavioral: Negative.        
Hepatitis     Hiatal hernia     Marijuana use, episodic     to help with sleeping    Rheumatoid arthritis (HCC)     Shingles        Past Surgical History:   Procedure Laterality Date    ARM SURGERY Left     CARPAL TUNNEL RELEASE Right 09/29/2018    HAND SURGERY      with fusion    HERNIA REPAIR         Current Outpatient Medications   Medication Sig Dispense Refill    gabapentin (NEURONTIN) 300 MG capsule Take 1 capsule by mouth 3 times daily for 90 days. Intended supply: 30 days Max Daily Amount: 900 mg 90 capsule 2    Acetaminophen (TYLENOL PO) Take by mouth      EQ ASPIRIN ADULT LOW DOSE 81 MG EC tablet       cyclobenzaprine (FLEXERIL) 10 MG tablet Take 1 tablet by mouth 2 times daily as needed for Muscle spasms 60 tablet 5     No current facility-administered medications for this visit.       Allergies:  Sumatriptan    Social History:   Social History     Tobacco Use   Smoking Status Every Day    Current packs/day: 2.00    Average packs/day: 2.0 packs/day for 45.0 years (90.0 ttl pk-yrs)    Types: Cigarettes   Smokeless Tobacco Former     Social History     Substance and Sexual Activity   Alcohol Use Yes         Family History:   No family history on file.    REVIEW OF SYSTEMS:  Constitutional: Negative.    HENT: Negative.     Eyes: Negative.    Respiratory: Negative.     Cardiovascular: Negative.    Gastrointestinal: Negative.    Genitourinary: Negative.    Musculoskeletal:  Positive for back pain, joint pain, myalgias and neck pain.   Skin: Negative.    Neurological:  Positive for tingling.   Endo/Heme/Allergies: Negative.    Psychiatric/Behavioral: Negative.       PHYSICAL EXAM:  Vitals:    02/13/24 1011   BP: (!) 140/82   Pulse: 85   Resp: 18   SpO2: 98%     Constitutional: appears well-developed and well-nourished.   Eyes - conjunctiva normal.  Pupils react to light  Ear, nose, throat - hearing intact to finger rub, No scars, masses, or lesions over external nose or ears, no atrophy oftongue  Neck-

## 2024-03-12 ENCOUNTER — OFFICE VISIT (OUTPATIENT)
Dept: NEUROLOGY | Age: 59
End: 2024-03-12
Payer: MEDICAID

## 2024-03-12 VITALS
HEIGHT: 74 IN | HEART RATE: 98 BPM | BODY MASS INDEX: 19.12 KG/M2 | OXYGEN SATURATION: 97 % | DIASTOLIC BLOOD PRESSURE: 94 MMHG | WEIGHT: 149 LBS | SYSTOLIC BLOOD PRESSURE: 164 MMHG

## 2024-03-12 DIAGNOSIS — Z79.899 MEDICATION MANAGEMENT: ICD-10-CM

## 2024-03-12 DIAGNOSIS — R29.898 WEAKNESS OF RIGHT LOWER EXTREMITY: ICD-10-CM

## 2024-03-12 DIAGNOSIS — M54.50 LUMBAR PAIN: Primary | ICD-10-CM

## 2024-03-12 DIAGNOSIS — M54.2 CERVICAL PAIN: ICD-10-CM

## 2024-03-12 DIAGNOSIS — G45.9 TIA (TRANSIENT ISCHEMIC ATTACK): ICD-10-CM

## 2024-03-12 PROCEDURE — 99214 OFFICE O/P EST MOD 30 MIN: CPT | Performed by: NURSE PRACTITIONER

## 2024-03-12 NOTE — PROGRESS NOTES
REVIEW OF SYSTEMS    Constitutional: []Fever []Sweat []Chills [] Recent Injury [x] Denies all unless marked  HEENT:[]Headache  [] Head Injury/Hearing Loss  [] Sore Throat  [] Ear Ache/Dizziness  [x] Denies all unless marked  Spine:  [x] Neck pain  [] Back pain  [] Sciaticia  [x] Denies all unless marked  Cardiovascular:[]Heart Disease []Chest Pain [] Palpitations  [x] Denies all unless marked  Pulmonary: []Shortness of Breath []Cough   [x] Denies all unless marke  Gastrointestinal: []Nausea  []Vomiting  []Abdominal Pain  []Constipation  []Diarrhea  []Dark Bloody Stools  [x] Denies all unless marked  Psychiatric/Behavioral:[] Depression [] Anxiety [x] Denies all unless marked  Genitourinary:   [] Frequency  [] Urgency  [] Incontinence [] Pain with Urination  [x] Denies all unless marked  Extremities: [x]Pain  []Swelling  [x] Denies all unless marked  Musculoskeletal: [x] Muscle Pain  [x] Joint Pain  [] Arthritis [] Muscle Cramps [] Muscle Twitches  [x] Denies all unless marked  Sleep: [] Insomnia [] Snoring [] Restless Legs [] Sleep Apnea  [] Daytime Sleepiness  [x] Denies all unless marked  Skin:[] Rash [] Skin Discoloration [x] Denies all unless marked   Neurological: []Visual Disturbance/Memory Loss [x] Loss of Balance [] Slurred Speech/Weakness [] Seizures  [] Vertigo/Dizziness [x] Denies all unless marked

## 2024-03-12 NOTE — PROGRESS NOTES
[facility] Office Note      Patient:   Mikey Meier  MR#:    [id number]  Account Number:                         YOB: 1965  Date of Evaluation:  3/12/2024  Time of Note:                          12:38 PM  Primary/Referring Physician:  Etienne Chandler   Consulting Physician:  NEMO Zamudio    FOLLOW UP    Chief Complaint   Patient presents with    Follow-up     Cervical pain, TIA       HISTORY OF PRESENT ILLNESS    Mikey Meier is a 58 y.o. year old male here for follow of TIA and cervical pain.  No new strokelike symptoms.  He is still having ongoing severe right-sided lumbar, hip, thigh pain.  Having some leg weakness at times, primarily to right.  At previous visit started gabapentin 300 3 times daily and a Medrol Dosepak, no clear benefit.  States he cannot tell he is taking gabapentin.  Past MRI brain results received from [facility], no CVA.  CTA head and neck without significant stenosis.  Zio patch completed that was largely normal.  Echocardiogram normal.  MRI cervical spine completed with multiple abnormalities.  Evaluated by neurosurgery and could potentially be surgical candidate but he is unable to take off work for that amount of time.  He will be starting physical therapy.  He has had benefit from Flexeril. TIA occurred in August. He states he was found on the couch confused, eyes rolled back in his head. Was seen [place] [facility] and [place] brain, he reports it did not show a stroke.  States that this time he had slurred speech and felt that he knew what he wanted to say but could not speak.  Was having generalized weakness and what is described as possible dystonias to BUE.  He states he returned to baseline that same day.  He also reports about 2 weeks ago he had a fall and struck his head.  Reports he fell due to getting tripped by his dog, struck frontal head into corner of metal handrail on steps.  States he was seeing and hearing things following

## 2024-03-21 DIAGNOSIS — R29.898 WEAKNESS OF RIGHT LOWER EXTREMITY: Primary | ICD-10-CM

## 2024-03-21 RX ORDER — GABAPENTIN 400 MG/1
400 CAPSULE ORAL 2 TIMES DAILY
Qty: 60 CAPSULE | Refills: 5 | Status: SHIPPED | OUTPATIENT
Start: 2024-03-21 | End: 2024-09-17

## 2024-03-21 RX ORDER — GABAPENTIN 300 MG/1
300 CAPSULE ORAL
Qty: 30 CAPSULE | Refills: 5 | Status: SHIPPED | OUTPATIENT
Start: 2024-03-21 | End: 2024-09-17

## 2024-03-21 NOTE — TELEPHONE ENCOUNTER
Patient called stated that at his last appt with No she increased his Gabapentin to 400 mg in the am  300 mg noon and 400 mg in the pm . New script was not sent in, and patient is now out of medication.   Did research note and seen where she stated the medication increase.   Will calvin up medication for patient.

## 2024-04-04 ENCOUNTER — HOSPITAL ENCOUNTER (OUTPATIENT)
Dept: MRI IMAGING | Age: 59
Discharge: HOME | End: 2024-04-04
Payer: MEDICAID

## 2024-04-04 DIAGNOSIS — R29.898 WEAKNESS OF RIGHT LOWER EXTREMITY: ICD-10-CM

## 2024-04-04 DIAGNOSIS — M54.50 LUMBAR PAIN: ICD-10-CM

## 2024-04-04 PROCEDURE — 72158 MRI LUMBAR SPINE W/O & W/DYE: CPT

## 2024-04-10 ENCOUNTER — TELEPHONE (OUTPATIENT)
Dept: NEUROLOGY | Age: 59
End: 2024-04-10

## 2024-04-10 NOTE — TELEPHONE ENCOUNTER
Called and spoke with patient. I advised him of EG note seen below. He voiced his understanding and had no questions at this time.    ----- Message from NEMO Mak sent at 4/5/2024  9:57 AM CDT -----  Mild DDD noted in the lumbar spine, no significant NF narrowing or spinal canal stenosis.

## 2024-05-14 ENCOUNTER — OFFICE VISIT (OUTPATIENT)
Dept: NEUROSURGERY | Age: 59
End: 2024-05-14
Payer: MEDICAID

## 2024-05-14 VITALS
BODY MASS INDEX: 19.12 KG/M2 | SYSTOLIC BLOOD PRESSURE: 146 MMHG | RESPIRATION RATE: 18 BRPM | HEART RATE: 94 BPM | DIASTOLIC BLOOD PRESSURE: 93 MMHG | WEIGHT: 149 LBS | HEIGHT: 74 IN

## 2024-05-14 DIAGNOSIS — R29.898 WEAKNESS OF RIGHT LOWER EXTREMITY: ICD-10-CM

## 2024-05-14 DIAGNOSIS — G89.29 CHRONIC MIDLINE LOW BACK PAIN WITH RIGHT-SIDED SCIATICA: ICD-10-CM

## 2024-05-14 DIAGNOSIS — M54.41 CHRONIC MIDLINE LOW BACK PAIN WITH RIGHT-SIDED SCIATICA: ICD-10-CM

## 2024-05-14 DIAGNOSIS — M62.522 ATROPHY OF MUSCLE OF LEFT UPPER ARM: ICD-10-CM

## 2024-05-14 DIAGNOSIS — M51.36 DDD (DEGENERATIVE DISC DISEASE), LUMBAR: Primary | ICD-10-CM

## 2024-05-14 DIAGNOSIS — M48.02 FORAMINAL STENOSIS OF CERVICAL REGION: ICD-10-CM

## 2024-05-14 DIAGNOSIS — M50.30 DDD (DEGENERATIVE DISC DISEASE), CERVICAL: ICD-10-CM

## 2024-05-14 PROCEDURE — 99214 OFFICE O/P EST MOD 30 MIN: CPT | Performed by: NEUROLOGICAL SURGERY

## 2024-05-14 RX ORDER — GABAPENTIN 400 MG/1
400 CAPSULE ORAL 2 TIMES DAILY
Qty: 60 CAPSULE | Refills: 5 | Status: SHIPPED | OUTPATIENT
Start: 2024-05-14 | End: 2024-11-10

## 2024-05-14 ASSESSMENT — ENCOUNTER SYMPTOMS
BACK PAIN: 1
GASTROINTESTINAL NEGATIVE: 1
RESPIRATORY NEGATIVE: 1
EYES NEGATIVE: 1

## 2024-05-14 NOTE — PROGRESS NOTES
Review of Systems   Constitutional: Negative.    HENT: Negative.     Eyes: Negative.    Respiratory: Negative.     Cardiovascular: Negative.    Gastrointestinal: Negative.    Genitourinary: Negative.    Musculoskeletal:  Positive for back pain, joint pain, myalgias and neck pain.   Skin: Negative.    Neurological:  Positive for tingling, weakness and headaches.   Endo/Heme/Allergies: Negative.    Psychiatric/Behavioral: Negative.        
Bronchitis     COPD (chronic obstructive pulmonary disease) (HCC)     Fractures     GERD (gastroesophageal reflux disease)     Hepatitis     Hiatal hernia     Marijuana use, episodic     to help with sleeping    Rheumatoid arthritis (HCC)     Shingles        Past Surgical History:   Procedure Laterality Date    ARM SURGERY Left     CARPAL TUNNEL RELEASE Right 09/29/2018    HAND SURGERY      with fusion    HERNIA REPAIR         Current Outpatient Medications   Medication Sig Dispense Refill    gabapentin (NEURONTIN) 400 MG capsule Take 1 capsule by mouth in the morning and at bedtime for 180 days. Max Daily Amount: 800 mg 60 capsule 5    gabapentin (NEURONTIN) 300 MG capsule Take 1 capsule by mouth Daily with lunch for 180 days. Intended supply: 30 days Max Daily Amount: 300 mg 30 capsule 5    gabapentin (NEURONTIN) 300 MG capsule Take 1 capsule by mouth 3 times daily for 90 days. Intended supply: 30 days Max Daily Amount: 900 mg (Patient taking differently: Take 1 capsule by mouth 3 times daily. Intended supply: 30 days    400mg QAM 300mg at lunch and 400mg QHS.) 90 capsule 2    Acetaminophen (TYLENOL PO) Take by mouth      cyclobenzaprine (FLEXERIL) 10 MG tablet Take 1 tablet by mouth 2 times daily as needed for Muscle spasms 60 tablet 5    EQ ASPIRIN ADULT LOW DOSE 81 MG EC tablet        No current facility-administered medications for this visit.       Allergies:  Sumatriptan    Social History:   Social History     Tobacco Use   Smoking Status Every Day    Current packs/day: 2.00    Average packs/day: 2.0 packs/day for 45.0 years (90.0 ttl pk-yrs)    Types: Cigarettes   Smokeless Tobacco Former     Social History     Substance and Sexual Activity   Alcohol Use Yes         Family History:   No family history on file.    REVIEW OF SYSTEMS:  Constitutional: Negative.    HENT: Negative.     Eyes: Negative.    Respiratory: Negative.     Cardiovascular: Negative.    Gastrointestinal: Negative.    Genitourinary:

## 2024-05-14 NOTE — TELEPHONE ENCOUNTER
Requested Prescriptions     Pending Prescriptions Disp Refills    gabapentin (NEURONTIN) 400 MG capsule 60 capsule 5     Sig: Take 1 capsule by mouth in the morning and at bedtime for 180 days. Max Daily Amount: 800 mg       Last Office Visit:  3/12/2024  Next Office Visit:  6/12/2024  Last Medication Refill:  3/21/24 script was never received by pharmacy      Cindy Alberto patient

## 2024-05-23 DIAGNOSIS — R29.898 WEAKNESS OF RIGHT LOWER EXTREMITY: ICD-10-CM

## 2024-05-23 DIAGNOSIS — M54.31 SCIATICA OF RIGHT SIDE: ICD-10-CM

## 2024-05-23 DIAGNOSIS — R29.898 RIGHT ARM WEAKNESS: ICD-10-CM

## 2024-05-23 DIAGNOSIS — R20.2 ARM PARESTHESIA, RIGHT: ICD-10-CM

## 2024-05-23 NOTE — TELEPHONE ENCOUNTER
Requested Prescriptions     Pending Prescriptions Disp Refills    gabapentin (NEURONTIN) 300 MG capsule 90 capsule 2     Sig: Take 1 capsule by mouth 3 times daily for 90 days. Intended supply: 30 days Max Daily Amount: 900 mg    gabapentin (NEURONTIN) 400 MG capsule 60 capsule 5     Sig: Take 1 capsule by mouth in the morning and at bedtime for 180 days. Max Daily Amount: 800 mg     300MG:  Last Office Visit:  3/12/2024  Next Office Visit:  6/12/2024  Last Medication Refill:  4/27/24 PER JYOTHI Mathis up to date:  5/23/24    *RX updated to reflect   5/27/24  fill date*       400MG  Last Office Visit:  3/12/2024  Next Office Visit:  6/12/2024  Last Medication Refill:  5/14/24 pharmacy never received, confirmed with [facility] up to date:  5/23/24    *RX updated to reflect   5/23/24  fill date*

## 2024-05-28 RX ORDER — GABAPENTIN 300 MG/1
300 CAPSULE ORAL 3 TIMES DAILY
Qty: 90 CAPSULE | Refills: 2 | Status: SHIPPED | OUTPATIENT
Start: 2024-05-28 | End: 2024-08-26

## 2024-05-28 RX ORDER — GABAPENTIN 400 MG/1
400 CAPSULE ORAL 2 TIMES DAILY
Qty: 60 CAPSULE | Refills: 5 | Status: SHIPPED | OUTPATIENT
Start: 2024-05-28 | End: 2024-11-24

## 2024-07-17 DIAGNOSIS — M54.2 CERVICAL SPINE PAIN: ICD-10-CM

## 2024-07-17 RX ORDER — CYCLOBENZAPRINE HCL 10 MG
10 TABLET ORAL 2 TIMES DAILY PRN
Qty: 60 TABLET | Refills: 2 | Status: SHIPPED | OUTPATIENT
Start: 2024-07-17 | End: 2024-08-16

## 2024-07-24 ENCOUNTER — OFFICE VISIT (OUTPATIENT)
Dept: NEUROLOGY | Age: 59
End: 2024-07-24
Payer: MEDICAID

## 2024-07-24 VITALS
SYSTOLIC BLOOD PRESSURE: 127 MMHG | BODY MASS INDEX: 19.12 KG/M2 | DIASTOLIC BLOOD PRESSURE: 81 MMHG | OXYGEN SATURATION: 98 % | HEIGHT: 74 IN | HEART RATE: 87 BPM | WEIGHT: 149 LBS

## 2024-07-24 DIAGNOSIS — M25.9 HIP PROBLEM: Primary | ICD-10-CM

## 2024-07-24 DIAGNOSIS — M54.50 LUMBAR PAIN: ICD-10-CM

## 2024-07-24 DIAGNOSIS — R29.898 WEAKNESS OF RIGHT LOWER EXTREMITY: ICD-10-CM

## 2024-07-24 DIAGNOSIS — Z79.899 MEDICATION MANAGEMENT: ICD-10-CM

## 2024-07-24 DIAGNOSIS — M54.2 CERVICAL PAIN: ICD-10-CM

## 2024-07-24 PROCEDURE — 99214 OFFICE O/P EST MOD 30 MIN: CPT | Performed by: NURSE PRACTITIONER

## 2024-07-24 RX ORDER — GABAPENTIN 400 MG/1
400 CAPSULE ORAL 3 TIMES DAILY
Qty: 270 CAPSULE | Refills: 0 | Status: SHIPPED | OUTPATIENT
Start: 2024-07-24 | End: 2024-10-22

## 2024-07-24 NOTE — PROGRESS NOTES
REVIEW OF SYSTEMS    Constitutional: []Fever []Sweat []Chills [] Recent Injury [x] Denies all unless marked  HEENT:[]Headache  [] Head Injury/Hearing Loss  [] Sore Throat  [] Ear Ache/Dizziness  [x] Denies all unless marked  Spine:  [x] Neck pain  [x] Back pain  [] Sciaticia  [x] Denies all unless marked  Cardiovascular:[]Heart Disease []Chest Pain [] Palpitations  [x] Denies all unless marked  Pulmonary: []Shortness of Breath []Cough   [x] Denies all unless marke  Gastrointestinal: []Nausea  []Vomiting  []Abdominal Pain  []Constipation  []Diarrhea  []Dark Bloody Stools  [x] Denies all unless marked  Psychiatric/Behavioral:[] Depression [] Anxiety [x] Denies all unless marked  Genitourinary:   [] Frequency  [] Urgency  [] Incontinence [] Pain with Urination  [x] Denies all unless marked  Extremities: []Pain  []Swelling  [x] Denies all unless marked  Musculoskeletal: [x] Muscle Pain  [x] Joint Pain  [] Arthritis [] Muscle Cramps [] Muscle Twitches  [x] Denies all unless marked  Sleep: [] Insomnia [] Snoring [] Restless Legs [] Sleep Apnea  [] Daytime Sleepiness  [x] Denies all unless marked  Skin:[] Rash [] Skin Discoloration [x] Denies all unless marked   Neurological: []Visual Disturbance/Memory Loss [x] Loss of Balance [] Slurred Speech/Weakness [] Seizures  [] Vertigo/Dizziness [x] Denies all unless marked       
spinal canal or neural foraminal stenosis.     IMPRESSION:     Multilevel degenerative changes of the cervical spine as detailed above, causing:  - Moderate spinal canal stenosis at C5-C6.  Mild to moderate spinal canal stenosis at C3-C4 and C4-C5.  Mild spinal canal stenosis at C6-C7.  - Severe bilateral neural foraminal stenosis at C3-C4.  Severe right and moderate to severe left neural foraminal stenosis at C4-C5.  Severe right and moderate left neural foraminal stenosis at C5-C6.  Severe right neural foraminal stenosis at C2-C3.    Moderate bilateral neural foraminal stenosis at C6-C7.        ______________________________________   Electronically signed by: MAGDALENA PEREIRA M.D.  Date:     11/07/2023  Time:    11:19     Echocardiogram 1/4/24  Summary   Normal size left atrium.   Normal intact intra-atrial septum was noted with no evidence of   significant intra-atrial communications by color flow Doppler or by   agitated saline study.   Normal left ventricular size with preserved LV function and an estimated   ejection fraction of approximately 55-60%.   Normal diastolic function.   No regional wall motion abnormalities.   No evidence of left ventricular mass or thrombus noted.      Signature      ----------------------------------------------------------------   Electronically signed by Mitchell Sloan MD(Interpreting   physician) on 01/04/2024 11:10 PM   ----------------------------------------------------------------    MRI L spine 4/4/24  FINDINGS:  The alignment is normal.  There is no bone marrow edema.  The paraspinal soft tissues are normal.  The lumbar spinal cord demonstrates normal signal on T2W images.  Five lumbar-type vertebral bodies are seen.  The conus medullaris is located   at the L1 level.     Segmental analysis:     T12-L1:  The central canal and neural foramina appear patent.     L1-L2:  The central canal and neural foramina appear patent.     L2-L3:  There is mild facet arthropathy.

## 2024-10-28 DIAGNOSIS — M54.50 LUMBAR PAIN: ICD-10-CM

## 2024-10-28 DIAGNOSIS — M54.2 CERVICAL PAIN: ICD-10-CM

## 2024-10-29 RX ORDER — GABAPENTIN 400 MG/1
400 CAPSULE ORAL 3 TIMES DAILY
Qty: 90 CAPSULE | Refills: 2 | Status: SHIPPED | OUTPATIENT
Start: 2024-10-29 | End: 2025-01-27

## 2024-10-29 NOTE — TELEPHONE ENCOUNTER
Requested Prescriptions     Pending Prescriptions Disp Refills    gabapentin (NEURONTIN) 400 MG capsule [Pharmacy Med Name: Gabapentin 400 MG Oral Capsule] 270 capsule 0     Sig: Take 1 capsule by mouth 3 times daily for 30 days. Max Daily Amount: 1,200 mg       Last Office Visit:  7/24/2024  Next Office Visit:  12/18/2024  Last Medication Refill:  7/24/2024 with 0 RF   Del up to date:  10/29/2024    *RX updated to reflect   10/29/2024  fill date*

## 2024-11-13 DIAGNOSIS — M54.2 CERVICAL SPINE PAIN: ICD-10-CM

## 2024-11-13 NOTE — TELEPHONE ENCOUNTER
Requested Prescriptions     Pending Prescriptions Disp Refills    cyclobenzaprine (FLEXERIL) 10 MG tablet [Pharmacy Med Name: Cyclobenzaprine HCl 10 MG Oral Tablet] 60 tablet 0     Sig: TAKE 1 TABLET BY MOUTH TWICE DAILY AS NEEDED FOR MUSCLE SPASM       Last Office Visit: Visit date not found  Next Office Visit: Visit date not found  Last Medication Refill: 7/17/2024 with 2 RF

## 2024-11-14 RX ORDER — CYCLOBENZAPRINE HCL 10 MG
TABLET ORAL
Qty: 60 TABLET | Refills: 0 | Status: SHIPPED | OUTPATIENT
Start: 2024-11-14

## 2024-12-12 DIAGNOSIS — M54.2 CERVICAL SPINE PAIN: ICD-10-CM

## 2024-12-12 RX ORDER — CYCLOBENZAPRINE HCL 10 MG
TABLET ORAL
Qty: 60 TABLET | Refills: 5 | Status: SHIPPED | OUTPATIENT
Start: 2024-12-12

## 2024-12-12 NOTE — TELEPHONE ENCOUNTER
Requested Prescriptions     Pending Prescriptions Disp Refills    cyclobenzaprine (FLEXERIL) 10 MG tablet [Pharmacy Med Name: Cyclobenzaprine HCl 10 MG Oral Tablet] 60 tablet 0     Sig: TAKE 1 TABLET BY MOUTH TWICE DAILY AS NEEDED FOR MUSCLE SPASM       Last Office Visit: Visit date not found  Next Office Visit: Visit date not found  Last Medication Refill: 11/14/24

## 2024-12-18 ENCOUNTER — OFFICE VISIT (OUTPATIENT)
Dept: NEUROLOGY | Age: 59
End: 2024-12-18
Payer: MEDICAID

## 2024-12-18 VITALS
OXYGEN SATURATION: 96 % | DIASTOLIC BLOOD PRESSURE: 88 MMHG | HEART RATE: 82 BPM | HEIGHT: 74 IN | SYSTOLIC BLOOD PRESSURE: 130 MMHG | BODY MASS INDEX: 19.12 KG/M2 | WEIGHT: 149 LBS

## 2024-12-18 DIAGNOSIS — M54.50 LUMBAR PAIN: ICD-10-CM

## 2024-12-18 DIAGNOSIS — F17.219 CIGARETTE NICOTINE DEPENDENCE WITH NICOTINE-INDUCED DISORDER: ICD-10-CM

## 2024-12-18 DIAGNOSIS — M54.2 CERVICAL PAIN: Primary | ICD-10-CM

## 2024-12-18 DIAGNOSIS — R93.7 ABNORMAL MRI, CERVICAL SPINE: ICD-10-CM

## 2024-12-18 DIAGNOSIS — R20.2 ARM PARESTHESIA, RIGHT: ICD-10-CM

## 2024-12-18 DIAGNOSIS — Z79.899 MEDICATION MANAGEMENT: ICD-10-CM

## 2024-12-18 DIAGNOSIS — R29.898 WEAKNESS OF RIGHT LOWER EXTREMITY: ICD-10-CM

## 2024-12-18 DIAGNOSIS — F32.A DEPRESSION, UNSPECIFIED DEPRESSION TYPE: ICD-10-CM

## 2024-12-18 LAB
AMPHET UR QL SCN: NEGATIVE
BARBITURATES UR QL SCN: NEGATIVE
BENZODIAZ UR QL SCN: NEGATIVE
BUPRENORPHINE URINE: NEGATIVE
CANNABINOIDS UR QL SCN: POSITIVE
COCAINE UR QL SCN: NEGATIVE
DRUG SCREEN COMMENT UR-IMP: ABNORMAL
FENTANYL SCREEN, URINE: NEGATIVE
METHADONE UR QL SCN: NEGATIVE
METHAMPHETAMINE, URINE: NEGATIVE
OPIATES UR QL SCN: NEGATIVE
OXYCODONE UR QL SCN: NEGATIVE
PCP UR QL SCN: NEGATIVE
TRICYCLIC ANTIDEPRESSANTS, UR: POSITIVE

## 2024-12-18 PROCEDURE — 99214 OFFICE O/P EST MOD 30 MIN: CPT | Performed by: NURSE PRACTITIONER

## 2024-12-18 RX ORDER — BUPROPION HYDROCHLORIDE 150 MG/1
150 TABLET ORAL EVERY MORNING
Qty: 30 TABLET | Refills: 3 | Status: SHIPPED | OUTPATIENT
Start: 2024-12-18

## 2024-12-18 NOTE — PROGRESS NOTES
[facility] Office Note      Patient:   Mikey Meier  MR#:    [id number]  Account Number:                         YOB: 1965  Date of Evaluation:  12/19/2024  Time of Note:                          8:30 AM  Primary/Referring Physician:  Etienne Chandler MD   Consulting Physician:  NEMO Zamudio    FOLLOW UP    Chief Complaint   Patient presents with    Follow-up     Pt states things are about the same.     Back Pain    Neck Pain       HISTORY OF PRESENT ILLNESS    Mikey Meier is a 59 y.o. year old male here for follow of TIA, cervical pain, lumbar pain.  He is stable overall, no new strokelike symptoms.  Still dealing with daily waxing and waning pain.  At last visit increase gabapentin to 400 mg 3 times daily, he has had clear benefit from a pain standpoint.  Tolerating well without side effects.  At prior visit referred to orthopedics for suspicion of hip etiology causing RLE symptoms, he has been evaluated and is a candidate for hip replacement.  Unfortunately needs to be nicotine free for some time before surgery, he has been working towards this goal.  Is using rollator now due to pain and instability of hip. Is still having ongoing cervical pain with upper extremity symptoms, this is unchanged.  No worsening.  Still having significant RLE pain, leg will give out on him sometimes.  Has started falling due to this.  MRI lumbar as below with no significant nerve compression.  Past MRI brain results received from [facility], no CVA.  CTA head and neck without significant stenosis.  Zio patch completed that was largely normal.  Echocardiogram normal.  MRI cervical spine completed with multiple abnormalities.  Evaluated by neurosurgery and could potentially be surgical candidate but he is unable to take off work for that amount of time. He has had benefit from Flexeril. TIA occurred in August 2023. He states he was found on the couch confused, eyes rolled back in his

## 2025-02-18 DIAGNOSIS — M54.50 LUMBAR PAIN: ICD-10-CM

## 2025-02-18 DIAGNOSIS — M54.2 CERVICAL PAIN: ICD-10-CM

## 2025-02-18 NOTE — TELEPHONE ENCOUNTER
Requested Prescriptions     Pending Prescriptions Disp Refills    gabapentin (NEURONTIN) 400 MG capsule [Pharmacy Med Name: Gabapentin 400 MG Oral Capsule] 90 capsule 0     Sig: Take 1 capsule by mouth 3 times daily.       Last Office Visit:  12/18/2024  Next Office Visit:  3/19/2025  Last Medication Refill:  10/29/24 tamiko Mathis up to date:  9/24/24    *RX updated to reflect   2/18/25  fill date*

## 2025-03-19 ENCOUNTER — OFFICE VISIT (OUTPATIENT)
Dept: NEUROLOGY | Age: 60
End: 2025-03-19
Payer: MEDICAID

## 2025-03-19 VITALS
BODY MASS INDEX: 19.12 KG/M2 | WEIGHT: 149 LBS | SYSTOLIC BLOOD PRESSURE: 156 MMHG | HEIGHT: 74 IN | OXYGEN SATURATION: 99 % | DIASTOLIC BLOOD PRESSURE: 95 MMHG | HEART RATE: 85 BPM

## 2025-03-19 DIAGNOSIS — M54.50 LUMBAR PAIN: ICD-10-CM

## 2025-03-19 DIAGNOSIS — M48.02 FORAMINAL STENOSIS OF CERVICAL REGION: ICD-10-CM

## 2025-03-19 DIAGNOSIS — G43.009 MIGRAINE WITHOUT AURA AND WITHOUT STATUS MIGRAINOSUS, NOT INTRACTABLE: Primary | ICD-10-CM

## 2025-03-19 DIAGNOSIS — F17.219 CIGARETTE NICOTINE DEPENDENCE WITH NICOTINE-INDUCED DISORDER: ICD-10-CM

## 2025-03-19 DIAGNOSIS — Z79.899 MEDICATION MANAGEMENT: ICD-10-CM

## 2025-03-19 DIAGNOSIS — G45.9 TIA (TRANSIENT ISCHEMIC ATTACK): ICD-10-CM

## 2025-03-19 DIAGNOSIS — M54.2 CERVICAL PAIN: Primary | ICD-10-CM

## 2025-03-19 DIAGNOSIS — M54.2 CERVICAL SPINE PAIN: ICD-10-CM

## 2025-03-19 PROCEDURE — G8420 CALC BMI NORM PARAMETERS: HCPCS | Performed by: NURSE PRACTITIONER

## 2025-03-19 PROCEDURE — 3017F COLORECTAL CA SCREEN DOC REV: CPT | Performed by: NURSE PRACTITIONER

## 2025-03-19 PROCEDURE — 99214 OFFICE O/P EST MOD 30 MIN: CPT | Performed by: NURSE PRACTITIONER

## 2025-03-19 PROCEDURE — 4004F PT TOBACCO SCREEN RCVD TLK: CPT | Performed by: NURSE PRACTITIONER

## 2025-03-19 PROCEDURE — G8427 DOCREV CUR MEDS BY ELIG CLIN: HCPCS | Performed by: NURSE PRACTITIONER

## 2025-03-19 RX ORDER — UBROGEPANT 100 MG/1
100 TABLET ORAL PRN
Qty: 3 TABLET | Refills: 0 | Status: SHIPPED | COMMUNITY
Start: 2025-03-19

## 2025-03-19 RX ORDER — CYCLOBENZAPRINE HCL 10 MG
10 TABLET ORAL 2 TIMES DAILY
Qty: 180 TABLET | Refills: 2 | Status: SHIPPED | OUTPATIENT
Start: 2025-03-19 | End: 2025-06-17

## 2025-03-19 RX ORDER — GABAPENTIN 400 MG/1
400 CAPSULE ORAL 3 TIMES DAILY
Qty: 90 CAPSULE | Refills: 2 | Status: SHIPPED | OUTPATIENT
Start: 2025-03-19 | End: 2025-06-17

## 2025-03-19 RX ORDER — BUPROPION HYDROCHLORIDE 300 MG/1
300 TABLET ORAL EVERY MORNING
Qty: 90 TABLET | Refills: 3 | Status: SHIPPED | OUTPATIENT
Start: 2025-03-19 | End: 2025-06-17

## 2025-03-19 NOTE — PROGRESS NOTES
[facility] Office Note      Patient:   Mikey Meier  MR#:    [id number]  Account Number:                         YOB: 1965  Date of Evaluation:  3/19/2025  Time of Note:                          8:50 PM  Primary/Referring Physician:  Etienne Chandler MD   Consulting Physician:  NEMO Zamudio    FOLLOW UP    Chief Complaint   Patient presents with    Follow-up    Cervical pain     Pt states neck pain increased some.     Migraine     Pt states having ~1-2 a month.     Medication Refill     Pt needs refill on gabapentin.      History of Present Illness  Mikey Meier is a 59 y.o. year old male here for a follow-up of neck pain, back pain, history of TIA, and headaches.  Stable from a stroke standpoint.  Having worsening of pain, for unclear reasons gabapentin was not refilled several months ago.  Was previously on 400 mg 3 times daily.  Using Flexeril with benefit.  As previously discussed at prior visit he was referred to orthopedics for suspicion of hip etiology causing RLE symptoms, he has been evaluated and is a candidate for hip replacement.  Unfortunately needs to be nicotine free for some time before surgery, he has been working towards this goal.  At prior visit started Wellbutrin 150 mg daily, he is doing quite well with this medicine and notes that it has decreased his craving for cigarettes.  Is continuing to work towards abstinence.      He experienced two episodes of migraines last month, each lasting for two days. These migraines were so debilitating that they rendered him unable to perform his daily activities.  He attempted to manage the pain with ibuprofen and Aleve, but these medications were ineffective.  He also reports constant tinnitus, which intensifies during his migraine episodes. Had an allergic reaction to Emgality in the past.  There is some associated photophobia, nausea, and scotoma like visual changes.  Using ibuprofen as needed along with esgic

## 2025-04-14 ENCOUNTER — TELEPHONE (OUTPATIENT)
Dept: NEUROLOGY | Age: 60
End: 2025-04-14

## 2025-04-14 ENCOUNTER — TELEPHONE (OUTPATIENT)
Age: 60
End: 2025-04-14

## 2025-04-14 NOTE — TELEPHONE ENCOUNTER
Patient has never been seen by  or any provider here. Patient will need appointment with a provider based on what body part he is concerned about.

## 2025-04-14 NOTE — TELEPHONE ENCOUNTER
Patient came in today to let the clinic know he has quit smoking and would like to be scheduled for surgery. Please give patient a call with sx dates.

## 2025-04-16 ENCOUNTER — OFFICE VISIT (OUTPATIENT)
Age: 60
End: 2025-04-16

## 2025-04-16 VITALS — HEIGHT: 74 IN | WEIGHT: 149 LBS | BODY MASS INDEX: 19.12 KG/M2

## 2025-04-16 DIAGNOSIS — Z00.00 PREVENTATIVE HEALTH CARE: ICD-10-CM

## 2025-04-16 DIAGNOSIS — M87.051 AVASCULAR NECROSIS OF HIP, RIGHT (HCC): ICD-10-CM

## 2025-04-16 DIAGNOSIS — M25.551 RIGHT HIP PAIN: Primary | ICD-10-CM

## 2025-04-16 DIAGNOSIS — T40.2X1A OPIOID OVERDOSE, ACCIDENTAL OR UNINTENTIONAL, INITIAL ENCOUNTER (HCC): ICD-10-CM

## 2025-04-16 RX ORDER — MUPIROCIN 20 MG/G
OINTMENT TOPICAL
Qty: 30 G | Refills: 0 | Status: SHIPPED | OUTPATIENT
Start: 2025-04-16

## 2025-04-16 ASSESSMENT — PROMIS GLOBAL HEALTH SCALE
SUM OF RESPONSES TO QUESTIONS 3, 6, 7, & 8: 16
IN GENERAL, HOW WOULD YOU RATE YOUR SATISFACTION WITH YOUR SOCIAL ACTIVITIES AND RELATIONSHIPS [ON A SCALE OF 1 (POOR) TO 5 (EXCELLENT)]?: POOR
TO WHAT EXTENT ARE YOU ABLE TO CARRY OUT YOUR EVERYDAY PHYSICAL ACTIVITIES SUCH AS WALKING, CLIMBING STAIRS, CARRYING GROCERIES, OR MOVING A CHAIR [ON A SCALE OF 1 (NOT AT ALL) TO 5 (COMPLETELY)]?: A LITTLE
SUM OF RESPONSES TO QUESTIONS 2, 4, 5, & 10: 8
IN GENERAL, HOW WOULD YOU RATE YOUR PHYSICAL HEALTH [ON A SCALE OF 1 (POOR) TO 5 (EXCELLENT)]?: GOOD
WHO IS THE PERSON COMPLETING THE PROMIS V1.1 SURVEY?: SELF
IN THE PAST 7 DAYS, HOW WOULD YOU RATE YOUR FATIGUE ON AVERAGE [ON A SCALE FROM 1 (NONE) TO 5 (VERY SEVERE)]?: SEVERE
IN GENERAL, WOULD YOU SAY YOUR QUALITY OF LIFE IS...[ON A SCALE OF 1 (POOR) TO 5 (EXCELLENT)]: FAIR
HOW IS THE PROMIS V1.1 BEING ADMINISTERED?: PAPER
IN THE PAST 7 DAYS, HOW OFTEN HAVE YOU BEEN BOTHERED BY EMOTIONAL PROBLEMS, SUCH AS FEELING ANXIOUS, DEPRESSED, OR IRRITABLE [ON A SCALE FROM 1 (NEVER) TO 5 (ALWAYS)]?: OFTEN
IN THE PAST 7 DAYS, HOW WOULD YOU RATE YOUR PAIN ON AVERAGE [ON A SCALE FROM 0 (NO PAIN) TO 10 (WORST IMAGINABLE PAIN)]?: 9
IN GENERAL, HOW WOULD YOU RATE YOUR MENTAL HEALTH, INCLUDING YOUR MOOD AND YOUR ABILITY TO THINK [ON A SCALE OF 1 (POOR) TO 5 (EXCELLENT)]?: GOOD
IN GENERAL, WOULD YOU SAY YOUR HEALTH IS...[ON A SCALE OF 1 (POOR) TO 5 (EXCELLENT)]: GOOD
IN GENERAL, PLEASE RATE HOW WELL YOU CARRY OUT YOUR USUAL SOCIAL ACTIVITIES (INCLUDES ACTIVITIES AT HOME, AT WORK, AND IN YOUR COMMUNITY, AND RESPONSIBILITIES AS A PARENT, CHILD, SPOUSE, EMPLOYEE, FRIEND, ETC) [ON A SCALE OF 1 (POOR) TO 5 (EXCELLENT)]?: POOR

## 2025-04-16 ASSESSMENT — HOOS JR
RISING FROM SITTING: MODERATE
HOOS JR RAW SCORE: 16
BENDING TO THE FLOOR TO PICK UP OBJECT: SEVERE
GOING UP OR DOWN STAIRS: SEVERE
WALKING ON UNEVEN SURFACE: SEVERE
SITTING: MODERATE
HOOS JR TOTAL INTERVAL SCORE: 39.902
HOOS JR RAW SCORE: 16
LYING IN BED (TURNING OVER, MAINTAINING HIP POSITION): SEVERE

## 2025-04-16 NOTE — PROGRESS NOTES
[facility]  [facility]  [address]  [facility] [id number] [phone]         Mikey Meier (: 1965) is a 59 y.o. male, patient, here for evaluation of the following chief complaint(s): Follow-up (Right hip)  .         Patient's PCP: None, None     Patient's Last Appointment in this Department was on Visit date not found      Subjective:     Chief Complaint   Patient presents with    Follow-up     Right hip        History of Present Illness  The patient presents for a follow-up on his right hip pain.    Right hip pain has been present since 2024. He was last evaluated in 2024, at which time hip replacement surgery was recommended but could not be performed due to his smoking habit. He has since quit smoking and has been smoke-free for 5 weeks. Mobility is significantly impaired, requiring the use of a crutch outdoors and a walker indoors.    A history of a mini stroke in 2024 is reported, which led to a brief hospital admission. No follow-up was required, and he is not on any anticoagulant therapy, although he takes baby aspirin. There is no history of heart disease.    He is currently unemployed due to his inability to perform his duties as a , a job that requires manual dexterity. Chronic anxiety and frustration are also reported, partly due to caring for his 93-year-old mother and recent breakup with his girlfriend.    SOCIAL HISTORY  Marital Status: Recently broke up with girlfriend.  Occupations:   Tobacco: Quit smoking 5 weeks ago.                Medications  Current Outpatient Medications   Medication Sig Dispense Refill    gabapentin (NEURONTIN) 400 MG capsule Take 1 capsule by mouth 3 times daily for 90 days. Max Daily Amount: 1,200 mg 90 capsule 2    buPROPion (WELLBUTRIN XL) 300 MG extended release tablet Take 1 tablet by mouth every morning 90 tablet 3    cyclobenzaprine (FLEXERIL) 10 MG

## 2025-04-24 ENCOUNTER — HOSPITAL ENCOUNTER (OUTPATIENT)
Dept: LAB | Age: 60
Discharge: HOME | End: 2025-04-24
Payer: MEDICAID

## 2025-04-24 ENCOUNTER — HOSPITAL ENCOUNTER (OUTPATIENT)
Dept: NON INVASIVE DIAGNOSTICS | Age: 60
Discharge: HOME | End: 2025-04-24
Payer: MEDICAID

## 2025-04-24 ENCOUNTER — HOSPITAL ENCOUNTER (OUTPATIENT)
Dept: GENERAL RADIOLOGY | Age: 60
Discharge: HOME | End: 2025-04-24
Payer: MEDICAID

## 2025-04-24 DIAGNOSIS — G47.33 OSA (OBSTRUCTIVE SLEEP APNEA): ICD-10-CM

## 2025-04-24 DIAGNOSIS — F17.210 CIGARETTE NICOTINE DEPENDENCE WITHOUT COMPLICATION: ICD-10-CM

## 2025-04-24 DIAGNOSIS — Z01.812 PRE-PROCEDURAL LABORATORY EXAMINATIONS: ICD-10-CM

## 2025-04-24 DIAGNOSIS — J44.9 CHRONIC OBSTRUCTIVE PULMONARY DISEASE, UNSPECIFIED COPD TYPE (HCC): ICD-10-CM

## 2025-04-24 DIAGNOSIS — Z01.811 PRE-OPERATIVE RESPIRATORY EXAMINATION: ICD-10-CM

## 2025-04-24 DIAGNOSIS — B18.1 CHRONIC VIRAL HEPATITIS B WITHOUT DELTA AGENT AND WITHOUT COMA (HCC): ICD-10-CM

## 2025-04-24 DIAGNOSIS — Z86.73 PERSONAL HISTORY OF TIA (TRANSIENT ISCHEMIC ATTACK): ICD-10-CM

## 2025-04-24 DIAGNOSIS — I10 PRIMARY HYPERTENSION: ICD-10-CM

## 2025-04-24 DIAGNOSIS — Z01.810 PRE-OPERATIVE CARDIOVASCULAR EXAMINATION: ICD-10-CM

## 2025-04-24 LAB
ALBUMIN SERPL-MCNC: 4.6 G/DL (ref 3.5–5.2)
ALP SERPL-CCNC: 127 U/L (ref 40–129)
ALT SERPL-CCNC: 16 U/L (ref 10–50)
AMYLASE SERPL-CCNC: 32 U/L (ref 28–100)
ANION GAP SERPL CALCULATED.3IONS-SCNC: 11 MMOL/L (ref 8–16)
APTT PPP: 31.5 SEC (ref 26–36.2)
AST SERPL-CCNC: 23 U/L (ref 10–50)
BASOPHILS # BLD: 0.1 K/UL (ref 0–0.2)
BASOPHILS NFR BLD: 1.3 % (ref 0–1)
BILIRUB SERPL-MCNC: 0.6 MG/DL (ref 0.2–1.2)
BUN SERPL-MCNC: 14 MG/DL (ref 6–20)
CALCIUM SERPL-MCNC: 9.7 MG/DL (ref 8.6–10)
CHLORIDE SERPL-SCNC: 99 MMOL/L (ref 98–107)
CO2 SERPL-SCNC: 29 MMOL/L (ref 22–29)
CREAT SERPL-MCNC: 0.9 MG/DL (ref 0.7–1.2)
EOSINOPHIL # BLD: 0.2 K/UL (ref 0–0.6)
EOSINOPHIL NFR BLD: 2.6 % (ref 0–5)
ERYTHROCYTE [DISTWIDTH] IN BLOOD BY AUTOMATED COUNT: 13.7 % (ref 11.5–14.5)
GLUCOSE SERPL-MCNC: 99 MG/DL (ref 70–99)
HCT VFR BLD AUTO: 51.1 % (ref 42–52)
HGB BLD-MCNC: 16.9 G/DL (ref 14–18)
IMM GRANULOCYTES # BLD: 0 K/UL
INR PPP: 1.01 (ref 0.88–1.18)
LIPASE SERPL-CCNC: 37 U/L (ref 13–60)
LYMPHOCYTES # BLD: 1.8 K/UL (ref 1.1–4.5)
LYMPHOCYTES NFR BLD: 29.7 % (ref 20–40)
MCH RBC QN AUTO: 31.4 PG (ref 27–31)
MCHC RBC AUTO-ENTMCNC: 33.1 G/DL (ref 33–37)
MCV RBC AUTO: 94.8 FL (ref 80–94)
MONOCYTES # BLD: 0.8 K/UL (ref 0–0.9)
MONOCYTES NFR BLD: 13.7 % (ref 0–10)
MRSA DNA SPEC QL NAA+PROBE: NOT DETECTED
NEUTROPHILS # BLD: 3.2 K/UL (ref 1.5–7.5)
NEUTS SEG NFR BLD: 52.4 % (ref 50–65)
PLATELET # BLD AUTO: 232 K/UL (ref 130–400)
PMV BLD AUTO: 10.7 FL (ref 9.4–12.4)
POTASSIUM SERPL-SCNC: 3.8 MMOL/L (ref 3.5–5.1)
PROT SERPL-MCNC: 7.7 G/DL (ref 6.4–8.3)
PROTHROMBIN TIME: 13.2 SEC (ref 12–14.6)
RBC # BLD AUTO: 5.39 M/UL (ref 4.7–6.1)
SODIUM SERPL-SCNC: 139 MMOL/L (ref 136–145)
WBC # BLD AUTO: 6.1 K/UL (ref 4.8–10.8)

## 2025-04-24 PROCEDURE — 71046 X-RAY EXAM CHEST 2 VIEWS: CPT

## 2025-04-27 LAB
EKG P AXIS: 77 DEGREES
EKG P-R INTERVAL: 132 MS
EKG Q-T INTERVAL: 352 MS
EKG QRS DURATION: 82 MS
EKG QTC CALCULATION (BAZETT): 397 MS
EKG T AXIS: 71 DEGREES

## 2025-04-28 LAB
COTININE SERPL-MCNC: <5 NG/ML
NICOTINE SERPL-MCNC: <5 NG/ML

## 2025-04-29 ENCOUNTER — TELEPHONE (OUTPATIENT)
Dept: CARDIOLOGY CLINIC | Age: 60
End: 2025-04-29

## 2025-04-29 ENCOUNTER — OFFICE VISIT (OUTPATIENT)
Dept: CARDIOLOGY CLINIC | Age: 60
End: 2025-04-29
Payer: MEDICAID

## 2025-04-29 VITALS
SYSTOLIC BLOOD PRESSURE: 120 MMHG | WEIGHT: 168 LBS | BODY MASS INDEX: 21.56 KG/M2 | HEART RATE: 68 BPM | HEIGHT: 74 IN | OXYGEN SATURATION: 97 % | DIASTOLIC BLOOD PRESSURE: 72 MMHG

## 2025-04-29 DIAGNOSIS — R03.0 ELEVATED BLOOD PRESSURE READING: ICD-10-CM

## 2025-04-29 DIAGNOSIS — G89.29 CHRONIC LOW BACK PAIN, UNSPECIFIED BACK PAIN LATERALITY, UNSPECIFIED WHETHER SCIATICA PRESENT: ICD-10-CM

## 2025-04-29 DIAGNOSIS — M54.50 CHRONIC LOW BACK PAIN, UNSPECIFIED BACK PAIN LATERALITY, UNSPECIFIED WHETHER SCIATICA PRESENT: ICD-10-CM

## 2025-04-29 DIAGNOSIS — Z82.49 FAMILY HISTORY OF HEART DISEASE: ICD-10-CM

## 2025-04-29 DIAGNOSIS — R94.31 ABNORMAL EKG: ICD-10-CM

## 2025-04-29 DIAGNOSIS — Z87.891 HISTORY OF TOBACCO USE: ICD-10-CM

## 2025-04-29 DIAGNOSIS — G89.29 CHRONIC RIGHT HIP PAIN: ICD-10-CM

## 2025-04-29 DIAGNOSIS — M25.551 CHRONIC RIGHT HIP PAIN: ICD-10-CM

## 2025-04-29 DIAGNOSIS — Z01.810 PREOPERATIVE CARDIOVASCULAR EXAMINATION: Primary | ICD-10-CM

## 2025-04-29 PROCEDURE — G8427 DOCREV CUR MEDS BY ELIG CLIN: HCPCS | Performed by: CLINICAL NURSE SPECIALIST

## 2025-04-29 PROCEDURE — 1036F TOBACCO NON-USER: CPT | Performed by: CLINICAL NURSE SPECIALIST

## 2025-04-29 PROCEDURE — G8420 CALC BMI NORM PARAMETERS: HCPCS | Performed by: CLINICAL NURSE SPECIALIST

## 2025-04-29 PROCEDURE — 99203 OFFICE O/P NEW LOW 30 MIN: CPT | Performed by: CLINICAL NURSE SPECIALIST

## 2025-04-29 PROCEDURE — 3017F COLORECTAL CA SCREEN DOC REV: CPT | Performed by: CLINICAL NURSE SPECIALIST

## 2025-04-29 RX ORDER — IBUPROFEN 600 MG/1
600 TABLET, FILM COATED ORAL EVERY 6 HOURS PRN
COMMUNITY

## 2025-04-29 ASSESSMENT — ENCOUNTER SYMPTOMS
VOMITING: 0
FACIAL SWELLING: 0
WHEEZING: 0
EYE REDNESS: 0
CHEST TIGHTNESS: 0
SHORTNESS OF BREATH: 0
ABDOMINAL PAIN: 0
COUGH: 0
NAUSEA: 0

## 2025-04-29 NOTE — PROGRESS NOTES
[facility]  [address] [address]  Phone: [phone]  Fax: [phone]    OFFICE VISIT:  2025    Mikey Meier - : 1965    Reason For Visit:  Mikey is a 59 y.o. male who is here for New Patient (No cardiac symptoms) and Cardiac Clearance (Rt hip replacement, Dr. Wild)       Diagnosis Orders   1. Preoperative cardiovascular examination        2. Abnormal EKG        3. History of tobacco use        4. Elevated blood pressure reading        5. Chronic low back pain, unspecified back pain laterality, unspecified whether sciatica present        6. Chronic right hip pain        7. Family history of heart disease              HPI  Patient is referred for cardiac risk stratification for upcoming hip surgery to be done by Dr. Wild on Friday.    Other history includes TIA, smoking (quit about 1 month ago), chronic back pain, migraines    Patient denies diabetes, hypertension, hyperlipidemia, but is also not seen regularly by PCP.  There is family history of heart disease in his father and brother    He denies that he is having any exertional type chest pain.  He denies unusual dyspnea, orthopnea, PND, edema, palpitations or fast heart rates        No primary care provider on file. is PCP.  Mikey Meier has the following history as recorded in [facility]:    Patient Active Problem List    Diagnosis Date Noted    Cervical pain 2018    Cervical radiculopathy 2018    Hx of trauma 2018    Arm paresthesia, right 2018    Myofascial pain 2018     Past Medical History:   Diagnosis Date    Broken ribs     Bronchitis     COPD (chronic obstructive pulmonary disease) (HCC)     Fractures     GERD (gastroesophageal reflux disease)     Hepatitis     Hiatal hernia     Marijuana use, episodic     to help with sleeping    Rheumatoid arthritis (HCC)     Shingles      Past Surgical History:   Procedure Laterality Date    ARM SURGERY Left     CARPAL

## 2025-04-29 NOTE — TELEPHONE ENCOUNTER
Please send letter for cardiac risk stratification for upcoming hip surgery to Dr. Wild's office  RCRI= 1  METS = 4

## 2025-04-29 NOTE — PATIENT INSTRUCTIONS
Return in about 3 months (around 7/29/2025) for APRN.  Letter to Dr Wild for cardiac risk stratification  Stay smoke free

## 2025-04-29 NOTE — TELEPHONE ENCOUNTER
Received a clearance request from Dr. Bullock for patient to have R Total Hip on 5/2/25.  Patient has never been seen here and will need an appt prior to giving clearance.  Patient has new patient appointment 4/29/25

## 2025-05-01 DIAGNOSIS — T40.2X1A OPIOID OVERDOSE, ACCIDENTAL OR UNINTENTIONAL, INITIAL ENCOUNTER (HCC): ICD-10-CM

## 2025-05-01 DIAGNOSIS — M25.551 RIGHT HIP PAIN: Primary | ICD-10-CM

## 2025-05-01 RX ORDER — OXYCODONE HYDROCHLORIDE 5 MG/1
5 TABLET ORAL EVERY 4 HOURS PRN
Qty: 42 TABLET | Refills: 0 | Status: SHIPPED | OUTPATIENT
Start: 2025-05-01 | End: 2025-05-08

## 2025-05-05 ENCOUNTER — TELEPHONE (OUTPATIENT)
Age: 60
End: 2025-05-05

## 2025-05-12 ENCOUNTER — TELEPHONE (OUTPATIENT)
Age: 60
End: 2025-05-12

## 2025-05-12 DIAGNOSIS — M25.551 RIGHT HIP PAIN: ICD-10-CM

## 2025-05-12 DIAGNOSIS — T40.2X1A OPIOID OVERDOSE, ACCIDENTAL OR UNINTENTIONAL, INITIAL ENCOUNTER (HCC): ICD-10-CM

## 2025-05-12 RX ORDER — OXYCODONE HYDROCHLORIDE 5 MG/1
5 TABLET ORAL EVERY 4 HOURS PRN
Qty: 42 TABLET | Refills: 0 | Status: SHIPPED | OUTPATIENT
Start: 2025-05-12 | End: 2025-05-19

## 2025-05-12 NOTE — TELEPHONE ENCOUNTER
Patient is calling regarding the following: Needs medication refiil. Medication requested oxycodone dose 5 pharmacy [facility]

## 2025-05-30 ENCOUNTER — OFFICE VISIT (OUTPATIENT)
Age: 60
End: 2025-05-30

## 2025-05-30 VITALS — HEIGHT: 74 IN | WEIGHT: 168 LBS | BODY MASS INDEX: 21.56 KG/M2

## 2025-05-30 DIAGNOSIS — M25.551 RIGHT HIP PAIN: Primary | ICD-10-CM

## 2025-05-30 DIAGNOSIS — Z96.641 S/P TOTAL RIGHT HIP ARTHROPLASTY: ICD-10-CM

## 2025-05-30 PROCEDURE — 99024 POSTOP FOLLOW-UP VISIT: CPT | Performed by: ORTHOPAEDIC SURGERY

## 2025-05-30 RX ORDER — HYDROCODONE BITARTRATE AND ACETAMINOPHEN 5; 325 MG/1; MG/1
1 TABLET ORAL EVERY 4 HOURS PRN
Qty: 40 TABLET | Refills: 0 | Status: SHIPPED | OUTPATIENT
Start: 2025-05-30 | End: 2025-06-06

## 2025-05-30 NOTE — PROGRESS NOTES
MG chewable tablet Take 1 tablet by mouth in the morning and at bedtime 60 tablet 0    docusate sodium (COLACE) 100 MG capsule 1-3 caps daily as needed for constipation 30 capsule 0    ibuprofen (ADVIL;MOTRIN) 400 MG tablet Take 1 tablet by mouth every 8 hours as needed for Pain One tablet by mouth every 8 hours as needed for pain 30 tablet 0    magnesium hydroxide (MILK OF MAGNESIA) 400 MG/5ML suspension Take 15 mLs by mouth daily as needed for Constipation 1 each 0    ibuprofen (ADVIL;MOTRIN) 600 MG tablet Take 1 tablet by mouth every 6 hours as needed for Pain      naloxone 4 MG/0.1ML LIQD nasal spray 1 spray by Nasal route as needed for Opioid Reversal 1 - 3 sprays as needed 1 each 0    mupirocin (BACTROBAN) 2 % ointment Apply intranasally twice a day for 7 consecutive days and the morning of surgery. 30 g 0    gabapentin (NEURONTIN) 400 MG capsule Take 1 capsule by mouth 3 times daily for 90 days. Max Daily Amount: 1,200 mg 90 capsule 2    buPROPion (WELLBUTRIN XL) 300 MG extended release tablet Take 1 tablet by mouth every morning 90 tablet 3    cyclobenzaprine (FLEXERIL) 10 MG tablet Take 1 tablet by mouth in the morning and at bedtime 180 tablet 2    Ubrogepant (UBRELVY) 100 MG TABS Take 100 mg by mouth as needed (migraine) 3 tablet 0    Acetaminophen (TYLENOL PO) Take by mouth      EQ ASPIRIN ADULT LOW DOSE 81 MG EC tablet        No current facility-administered medications for this visit.        Allergies  Allergies   Allergen Reactions    Sumatriptan Other (See Comments)        Review of Systems  System  Neg/Pos  Details  Constitutional  Negative  Chills, Fatigue, Fever and Night Sweats  Respiratory  Negative  Chest Pain, Cough and Dyspnea  Cardio   Negative  Leg Swelling  GI   Negative  Abdominal Pain, Constipation, Nausea and Vomiting     Negative  Urinary Incontinence   Endocrine  Negative  Weight Gain and Weight Loss  MS   Negative  Except as noted in HPI and Chief Complaint      Objective:   White

## 2025-06-03 ENCOUNTER — TELEPHONE (OUTPATIENT)
Age: 60
End: 2025-06-03

## 2025-06-03 NOTE — TELEPHONE ENCOUNTER
Called patient and he is going to pay for it out of pocket and called pharmacist to give them the verbal and they will call him once its ready to

## 2025-06-03 NOTE — TELEPHONE ENCOUNTER
Pt called and said he needs to talk to a nurse about meds that was called in he said that they are not covered

## 2025-07-01 DIAGNOSIS — M54.2 CERVICAL PAIN: ICD-10-CM

## 2025-07-01 DIAGNOSIS — M54.50 LUMBAR PAIN: ICD-10-CM

## 2025-07-01 RX ORDER — GABAPENTIN 400 MG/1
400 CAPSULE ORAL 3 TIMES DAILY
Qty: 90 CAPSULE | Refills: 5 | Status: SHIPPED | OUTPATIENT
Start: 2025-07-01 | End: 2025-07-31

## 2025-07-01 NOTE — TELEPHONE ENCOUNTER
Requested Prescriptions     Pending Prescriptions Disp Refills    gabapentin (NEURONTIN) 400 MG capsule [Pharmacy Med Name: Gabapentin 400 MG Oral Capsule] 90 capsule 5     Sig: Take 1 capsule by mouth 3 times daily for 30 days. Max Daily Amount: 1,200 mg       Last Office Visit: 3/19/2025  Next Office Visit: 7/14/2025  Last Medication Refill:5/29/2025      Pt of Cindy KEYS

## 2025-07-14 ENCOUNTER — OFFICE VISIT (OUTPATIENT)
Dept: NEUROLOGY | Age: 60
End: 2025-07-14
Payer: MEDICAID

## 2025-07-14 VITALS
DIASTOLIC BLOOD PRESSURE: 87 MMHG | OXYGEN SATURATION: 97 % | WEIGHT: 168 LBS | SYSTOLIC BLOOD PRESSURE: 139 MMHG | BODY MASS INDEX: 21.56 KG/M2 | HEART RATE: 96 BPM | HEIGHT: 74 IN

## 2025-07-14 DIAGNOSIS — Z79.899 MEDICATION MANAGEMENT: ICD-10-CM

## 2025-07-14 DIAGNOSIS — G43.009 MIGRAINE WITHOUT AURA AND WITHOUT STATUS MIGRAINOSUS, NOT INTRACTABLE: Primary | ICD-10-CM

## 2025-07-14 DIAGNOSIS — M54.2 CERVICAL PAIN: ICD-10-CM

## 2025-07-14 DIAGNOSIS — M54.2 CERVICAL SPINE PAIN: ICD-10-CM

## 2025-07-14 DIAGNOSIS — G45.9 TIA (TRANSIENT ISCHEMIC ATTACK): ICD-10-CM

## 2025-07-14 DIAGNOSIS — M54.50 LUMBAR PAIN: ICD-10-CM

## 2025-07-14 PROCEDURE — G8427 DOCREV CUR MEDS BY ELIG CLIN: HCPCS | Performed by: NURSE PRACTITIONER

## 2025-07-14 PROCEDURE — 99214 OFFICE O/P EST MOD 30 MIN: CPT | Performed by: NURSE PRACTITIONER

## 2025-07-14 PROCEDURE — G8420 CALC BMI NORM PARAMETERS: HCPCS | Performed by: NURSE PRACTITIONER

## 2025-07-14 PROCEDURE — 3017F COLORECTAL CA SCREEN DOC REV: CPT | Performed by: NURSE PRACTITIONER

## 2025-07-14 PROCEDURE — 1036F TOBACCO NON-USER: CPT | Performed by: NURSE PRACTITIONER

## 2025-07-14 RX ORDER — UBROGEPANT 100 MG/1
TABLET ORAL
Qty: 10 TABLET | Refills: 3 | Status: SHIPPED | OUTPATIENT
Start: 2025-07-14

## 2025-07-14 RX ORDER — CYCLOBENZAPRINE HCL 10 MG
10 TABLET ORAL 2 TIMES DAILY
Qty: 180 TABLET | Refills: 2 | Status: SHIPPED | OUTPATIENT
Start: 2025-07-14 | End: 2025-10-12

## 2025-07-14 NOTE — PROGRESS NOTES
[facility] Office Note      Patient:   Mikey Meier  MR#:    [id number]  Account Number:                         YOB: 1965  Date of Evaluation:  7/14/2025  Time of Note:                          2:33 PM  Consulting Physician:  NEMO Zamudio    FOLLOW UP    Chief Complaint   Patient presents with    Follow-up    Neck Pain     Pt states neck pain still the same but now having more low back pain.     Migraine     Pt states only having ~5 migraines since last visit.      History of Present Illness  Mikey Meier is a 59 y.o. year old male here for a follow-up of neck pain, back pain, history of TIA, and headaches.  No new strokelike symptoms.  Is status post right hip replacement with great benefit, did quite well with surgery.  Is still having ongoing chronic neck and back pain.  On gabapentin 40 mg 3 times daily with benefit, tolerating well.  Using Flexeril as well with benefit.  Is still on Wellbutrin 300 mg daily, this did assist with smoking cessation for him to undergo his surgical procedure.  Migraines stable, decreased in frequency.  Aborted with Ubrelvy. Had an allergic reaction to Emgality in the past.  There is some associated photophobia, nausea, and scotoma like visual changes. Has chronic ongoing neck pain. Weakness to upper extremities at times with paresthesias.     MRI lumbar as below with no significant nerve compression.  Past MRI brain results received from [facility], no CVA.  CTA head and neck without significant stenosis.  Zio patch completed that was largely normal.  Echocardiogram normal.  MRI cervical spine completed with multiple abnormalities.  Evaluated by neurosurgery and could potentially be surgical candidate.  Is now on disability.    TIA occurred in August 2023. He states he was found on the couch confused, eyes rolled back in his head. Was seen at [facility] and completed MRI brain, no CVA.  States that this time he had slurred speech and felt that

## 2025-07-15 ENCOUNTER — TELEPHONE (OUTPATIENT)
Dept: NEUROLOGY | Age: 60
End: 2025-07-15

## 2025-07-15 NOTE — TELEPHONE ENCOUNTER
Approved  PA Detail   Prior authorization approved  Payer: [employer] Patient's Payer Case ID: [id number]    [phone]  Note from payer: The request has been approved. The authorization is effective from 07/15/2025 to 10/15/2025, as long as the member is enrolled in their current health plan. A written notification letter will follow with additional details. - Additional information is available to assist in the completion of the prior authorization. This information is accessible via the PA Detail / Prior Auth Portal link.  Approval Details    Authorized from July 15, 2025 to October 15, 2025  Electronic appeal: Not supported  Prior auth initiated by: [facility] [address] [place] [address] [address] [facility] [phone] -  [phone]  View History  Notes     Time User Attachment    Attachment received from payer. 7/15/2025 12:30 PM Gal[facility], [facility] Prior Authorization Response Document      Pharmacy Benefits   Open Encounter RAJWINDER TIRADO  [facility]    Covered: Retail    Not covered: Mail Order    Unknown: Specialty, Long-Term Care  Member ID: [id number] BIN: [id number] : 1965   Group ID: [id number] PCN: [id number] Legal sex: M   Group name:    Address: [address] [place], [place] [address] [id number]    Medication Being Authorized    Ubrogepant (UBRELVY) 100 MG TABS  Take 1 tablet at the onset of migraine. May repeat once in 2 hours if no improvement. Do not exceed 2 tablets in 24 hours.  Dispense: 10 tablet Refills: 3   Start: 2025   Class: Normal Diagnoses: Migraine without aura and without status migrainosus, not intractable   This order has been released to its destination.  To be filled at: [facility] [address] [place] [address] [address] [facility] [phone] - f [phone]

## 2025-07-29 ENCOUNTER — OFFICE VISIT (OUTPATIENT)
Dept: CARDIOLOGY CLINIC | Age: 60
End: 2025-07-29
Payer: MEDICAID

## 2025-07-29 VITALS
OXYGEN SATURATION: 98 % | SYSTOLIC BLOOD PRESSURE: 148 MMHG | HEIGHT: 74 IN | WEIGHT: 155 LBS | DIASTOLIC BLOOD PRESSURE: 98 MMHG | BODY MASS INDEX: 19.89 KG/M2 | HEART RATE: 98 BPM

## 2025-07-29 DIAGNOSIS — F17.218 CIGARETTE NICOTINE DEPENDENCE WITH OTHER NICOTINE-INDUCED DISORDER: ICD-10-CM

## 2025-07-29 DIAGNOSIS — R94.31 ABNORMAL EKG: ICD-10-CM

## 2025-07-29 DIAGNOSIS — Z82.49 FAMILY HISTORY OF HEART DISEASE: Primary | ICD-10-CM

## 2025-07-29 DIAGNOSIS — I10 PRIMARY HYPERTENSION: ICD-10-CM

## 2025-07-29 PROCEDURE — 3080F DIAST BP >= 90 MM HG: CPT | Performed by: CLINICAL NURSE SPECIALIST

## 2025-07-29 PROCEDURE — 99213 OFFICE O/P EST LOW 20 MIN: CPT | Performed by: CLINICAL NURSE SPECIALIST

## 2025-07-29 PROCEDURE — 3017F COLORECTAL CA SCREEN DOC REV: CPT | Performed by: CLINICAL NURSE SPECIALIST

## 2025-07-29 PROCEDURE — 3077F SYST BP >= 140 MM HG: CPT | Performed by: CLINICAL NURSE SPECIALIST

## 2025-07-29 PROCEDURE — 4004F PT TOBACCO SCREEN RCVD TLK: CPT | Performed by: CLINICAL NURSE SPECIALIST

## 2025-07-29 PROCEDURE — G8427 DOCREV CUR MEDS BY ELIG CLIN: HCPCS | Performed by: CLINICAL NURSE SPECIALIST

## 2025-07-29 PROCEDURE — G8420 CALC BMI NORM PARAMETERS: HCPCS | Performed by: CLINICAL NURSE SPECIALIST

## 2025-07-29 RX ORDER — METOPROLOL SUCCINATE 25 MG/1
25 TABLET, EXTENDED RELEASE ORAL NIGHTLY
Qty: 30 TABLET | Refills: 5 | Status: SHIPPED | OUTPATIENT
Start: 2025-07-29

## 2025-07-29 ASSESSMENT — ENCOUNTER SYMPTOMS
ABDOMINAL PAIN: 0
VOMITING: 0
CHEST TIGHTNESS: 0
BACK PAIN: 1
NAUSEA: 0
COUGH: 0
FACIAL SWELLING: 0
EYE REDNESS: 0
WHEEZING: 0
SHORTNESS OF BREATH: 1

## 2025-07-29 NOTE — PROGRESS NOTES
[facility]  [address] [address]  Phone: [phone]  Fax: [phone]    OFFICE VISIT:  2025    Mikey Meier - : 1965    Reason For Visit:  Mikey is a 60 y.o. male who is here for Follow-up (No cardiac symptoms)       Diagnosis Orders   1. Family history of heart disease  CT CARDIAC CALCIUM SCORING      2. Primary hypertension  CT CARDIAC CALCIUM SCORING      3. Cigarette nicotine dependence with other nicotine-induced disorder  CT CARDIAC CALCIUM SCORING      4. Abnormal EKG                HPI  Patient was initially referred for cardiac risk stratification for hip surgery that was done about 3 months ago.  He was asked to return for coronary calcium screening.    Other history includes TIA, smoking (quit about 1 month ago), chronic back pain, migraines.  He does not see a PCP on regular basis    Patient denies diabetes, hypertension, hyperlipidemia, but is also not seen regularly by PCP.  He is a smoker and currently smokes half pack per day.  There is family history of heart disease in his father and brother    He denies that he is having any exertional type chest pain.  He denies unusual dyspnea, orthopnea, PND, edema, palpitations or fast heart rates      No primary care provider on file. is PCP.  Mikey Meier has the following history as recorded in [facility]:    Patient Active Problem List    Diagnosis Date Noted    Cervical pain 2018    Cervical radiculopathy 2018    Hx of trauma 2018    Arm paresthesia, right 2018    Myofascial pain 2018     Past Medical History:   Diagnosis Date    Broken ribs     Bronchitis     COPD (chronic obstructive pulmonary disease) (HCC)     Fractures     GERD (gastroesophageal reflux disease)     Hepatitis     Hiatal hernia     Marijuana use, episodic     to help with sleeping    Rheumatoid arthritis (HCC)     Shingles      Past Surgical History:   Procedure Laterality Date    ARM SURGERY

## 2025-07-29 NOTE — PATIENT INSTRUCTIONS
Return in about 1 month (around 8/29/2025) for APRN.    Coronary calcium scoring to screen for heart disease    Quit smoking.  Call the [place] Tobacco Quit Line 1-800-QUIT-NOW     Add Metoprolol Succinate 25mg for BP

## 2025-08-07 ENCOUNTER — HOSPITAL ENCOUNTER (OUTPATIENT)
Dept: CT IMAGING | Age: 60
Discharge: HOME | End: 2025-08-07
Payer: MEDICAID

## 2025-08-07 DIAGNOSIS — I10 PRIMARY HYPERTENSION: ICD-10-CM

## 2025-08-07 DIAGNOSIS — Z82.49 FAMILY HISTORY OF HEART DISEASE: ICD-10-CM

## 2025-08-07 DIAGNOSIS — F17.218 CIGARETTE NICOTINE DEPENDENCE WITH OTHER NICOTINE-INDUCED DISORDER: ICD-10-CM

## 2025-08-07 PROCEDURE — 75571 CT HRT W/O DYE W/CA TEST: CPT

## 2025-08-11 ENCOUNTER — OFFICE VISIT (OUTPATIENT)
Age: 60
End: 2025-08-11

## 2025-08-11 VITALS — WEIGHT: 153 LBS | BODY MASS INDEX: 19.64 KG/M2 | HEIGHT: 74 IN

## 2025-08-11 DIAGNOSIS — Z96.641 S/P TOTAL RIGHT HIP ARTHROPLASTY: Primary | ICD-10-CM

## 2025-08-11 ASSESSMENT — ENCOUNTER SYMPTOMS: SHORTNESS OF BREATH: 0

## 2025-08-27 ENCOUNTER — OFFICE VISIT (OUTPATIENT)
Dept: CARDIOLOGY CLINIC | Age: 60
End: 2025-08-27
Payer: MEDICAID

## 2025-08-27 VITALS
SYSTOLIC BLOOD PRESSURE: 128 MMHG | WEIGHT: 155 LBS | HEART RATE: 68 BPM | DIASTOLIC BLOOD PRESSURE: 86 MMHG | HEIGHT: 74 IN | BODY MASS INDEX: 19.89 KG/M2 | OXYGEN SATURATION: 98 %

## 2025-08-27 DIAGNOSIS — R93.1 ELEVATED CORONARY ARTERY CALCIUM SCORE: Primary | ICD-10-CM

## 2025-08-27 DIAGNOSIS — I10 PRIMARY HYPERTENSION: ICD-10-CM

## 2025-08-27 DIAGNOSIS — E78.2 MIXED HYPERLIPIDEMIA: ICD-10-CM

## 2025-08-27 DIAGNOSIS — Z82.49 FAMILY HISTORY OF HEART DISEASE: ICD-10-CM

## 2025-08-27 DIAGNOSIS — F17.218 CIGARETTE NICOTINE DEPENDENCE WITH OTHER NICOTINE-INDUCED DISORDER: ICD-10-CM

## 2025-08-27 PROCEDURE — 3074F SYST BP LT 130 MM HG: CPT | Performed by: CLINICAL NURSE SPECIALIST

## 2025-08-27 PROCEDURE — G8420 CALC BMI NORM PARAMETERS: HCPCS | Performed by: CLINICAL NURSE SPECIALIST

## 2025-08-27 PROCEDURE — G8427 DOCREV CUR MEDS BY ELIG CLIN: HCPCS | Performed by: CLINICAL NURSE SPECIALIST

## 2025-08-27 PROCEDURE — 3079F DIAST BP 80-89 MM HG: CPT | Performed by: CLINICAL NURSE SPECIALIST

## 2025-08-27 PROCEDURE — 99213 OFFICE O/P EST LOW 20 MIN: CPT | Performed by: CLINICAL NURSE SPECIALIST

## 2025-08-27 PROCEDURE — 3017F COLORECTAL CA SCREEN DOC REV: CPT | Performed by: CLINICAL NURSE SPECIALIST

## 2025-08-27 PROCEDURE — 99406 BEHAV CHNG SMOKING 3-10 MIN: CPT | Performed by: CLINICAL NURSE SPECIALIST

## 2025-08-27 PROCEDURE — 4004F PT TOBACCO SCREEN RCVD TLK: CPT | Performed by: CLINICAL NURSE SPECIALIST

## 2025-08-27 RX ORDER — PRAVASTATIN SODIUM 40 MG
40 TABLET ORAL DAILY
Qty: 30 TABLET | Refills: 5 | Status: SHIPPED | OUTPATIENT
Start: 2025-08-27

## 2025-08-27 ASSESSMENT — ENCOUNTER SYMPTOMS
EYE REDNESS: 0
ABDOMINAL PAIN: 0
COUGH: 0
VOMITING: 0
NAUSEA: 0
FACIAL SWELLING: 0
BACK PAIN: 1
WHEEZING: 0
SHORTNESS OF BREATH: 1
CHEST TIGHTNESS: 0